# Patient Record
Sex: FEMALE | Race: WHITE | NOT HISPANIC OR LATINO | Employment: FULL TIME | URBAN - METROPOLITAN AREA
[De-identification: names, ages, dates, MRNs, and addresses within clinical notes are randomized per-mention and may not be internally consistent; named-entity substitution may affect disease eponyms.]

---

## 2017-01-16 ENCOUNTER — OFFICE VISIT (OUTPATIENT)
Dept: FAMILY MEDICINE | Facility: CLINIC | Age: 37
End: 2017-01-16
Payer: COMMERCIAL

## 2017-01-16 VITALS
WEIGHT: 240 LBS | BODY MASS INDEX: 40.97 KG/M2 | SYSTOLIC BLOOD PRESSURE: 121 MMHG | HEIGHT: 64 IN | OXYGEN SATURATION: 98 % | HEART RATE: 75 BPM | RESPIRATION RATE: 18 BRPM | TEMPERATURE: 98.1 F | DIASTOLIC BLOOD PRESSURE: 78 MMHG

## 2017-01-16 DIAGNOSIS — N97.9 FEMALE INFERTILITY: ICD-10-CM

## 2017-01-16 DIAGNOSIS — L65.9 HAIR LOSS: Primary | ICD-10-CM

## 2017-01-16 LAB
ERYTHROCYTE [DISTWIDTH] IN BLOOD BY AUTOMATED COUNT: 12.7 % (ref 10–15)
HCT VFR BLD AUTO: 41 % (ref 35–47)
HGB BLD-MCNC: 14.2 G/DL (ref 11.7–15.7)
MCH RBC QN AUTO: 29.2 PG (ref 26.5–33)
MCHC RBC AUTO-ENTMCNC: 34.6 G/DL (ref 31.5–36.5)
MCV RBC AUTO: 84 FL (ref 78–100)
PLATELET # BLD AUTO: 302 10E9/L (ref 150–450)
RBC # BLD AUTO: 4.87 10E12/L (ref 3.8–5.2)
WBC # BLD AUTO: 10.7 10E9/L (ref 4–11)

## 2017-01-16 PROCEDURE — 83540 ASSAY OF IRON: CPT | Performed by: NURSE PRACTITIONER

## 2017-01-16 PROCEDURE — 85027 COMPLETE CBC AUTOMATED: CPT | Performed by: NURSE PRACTITIONER

## 2017-01-16 PROCEDURE — 99213 OFFICE O/P EST LOW 20 MIN: CPT | Performed by: NURSE PRACTITIONER

## 2017-01-16 PROCEDURE — 83550 IRON BINDING TEST: CPT | Performed by: NURSE PRACTITIONER

## 2017-01-16 PROCEDURE — 84443 ASSAY THYROID STIM HORMONE: CPT | Performed by: NURSE PRACTITIONER

## 2017-01-16 PROCEDURE — 82728 ASSAY OF FERRITIN: CPT | Performed by: NURSE PRACTITIONER

## 2017-01-16 PROCEDURE — 36415 COLL VENOUS BLD VENIPUNCTURE: CPT | Performed by: NURSE PRACTITIONER

## 2017-01-16 NOTE — PROGRESS NOTES
"  SUBJECTIVE:                                                    Nancy Greene is a 36 year old female who presents to clinic today for the following health issues:      Hair Loss     Pt noticed hair loss for about 1 1/2 months. Handfuls of hair strands after taking shower and during the day. Pt had switched shampoo to a sulfate free shampoo and hair loss started after this. Pt stopped apple cider vinegar about 2 weeks ago.    Patient describes the following symptoms: Weight stable, no skin changes, no constipation, no loose stools and hair loss       Amount of exercise or physical activity: None    Problems taking medications regularly: No    Medication side effects: none    Diet: regular (no restrictions)    No changes in diet, no recent weight loss, change in stress.    No change in body hair.    No rashes.  She was taking a vitamin for skin and nails and stopped it about a month ago.  She has been on progesterone since July due to infertility.  She is seeing gyn and has been trying to get pregnant for the past 3 years.          Problem list and histories reviewed & adjusted, as indicated.  Additional history: as documented    Problem list, Medication list, Allergies, and Medical/Social/Surgical histories reviewed in [facility] and updated as appropriate.    ROS:  C: NEGATIVE for fever, chills, change in weight  INTEGUMENTARY/SKIN: NEGATIVE for worrisome rashes, moles or lesions  E/M: NEGATIVE for ear, mouth and throat problems  R: NEGATIVE for significant cough or SOB  CV: NEGATIVE for chest pain, palpitations or peripheral edema  GI: NEGATIVE for nausea, abdominal pain, heartburn, or change in bowel habits  ENDOCRINE: NEGATIVE for temperature intolerance, skin/hair changes  PSYCHIATRIC: NEGATIVE for changes in mood or affect    OBJECTIVE:                                                    /78 mmHg  Pulse 75  Temp(Src) 98.1  F (36.7  C) (Oral)  Resp 18  Ht 5' 4\" (1.626 m)  Wt 240 lb (108.863 kg) "  BMI 41.18 kg/m2  SpO2 98%  Body mass index is 41.18 kg/(m^2).  GENERAL: healthy, alert and no distress  SKIN: no suspicious lesions or rashes; no discrete areas of alopecia on the scalp  PSYCH: mentation appears normal, affect normal/bright         ASSESSMENT/PLAN:                                                            1. Hair loss  If all labs are normal, she could restart biotin and monitor for changes over the next couple of months.   Will refer to dermatology if hair loss persists.   - Progesterone Micronized (PROGESTERONE PO); Take by mouth daily 1 tablet daily  - TSH with free T4 reflex  - CBC with platelets  - Ferritin  - Iron and iron binding capacity    2. Female infertility  She will continue to follow up with gyn.           Deborah Mayes NP  [facility]

## 2017-01-16 NOTE — Clinical Note
Please abstract the following data from this visit with this patient into the appropriate field in Epic:  Pap smear done on this date: December 2015 (approximately), by this group: [facility] in [place], [place] results were normal.  Danika Carlson MA

## 2017-01-16 NOTE — NURSING NOTE
"Chief Complaint   Patient presents with     Hair Loss       Initial /78 mmHg  Pulse 75  Temp(Src) 98.1  F (36.7  C) (Oral)  Resp 18  Ht 5' 4\" (1.626 m)  Wt 240 lb (108.863 kg)  BMI 41.18 kg/m2  SpO2 98% Estimated body mass index is 41.18 kg/(m^2) as calculated from the following:    Height as of this encounter: 5' 4\" (1.626 m).    Weight as of this encounter: 240 lb (108.863 kg).  BP completed using cuff size: kylie Carlson MA      "

## 2017-01-17 LAB
FERRITIN SERPL-MCNC: 135 NG/ML (ref 12–150)
IRON SATN MFR SERPL: 25 % (ref 15–46)
IRON SERPL-MCNC: 84 UG/DL (ref 35–180)
TIBC SERPL-MCNC: 334 UG/DL (ref 240–430)
TSH SERPL DL<=0.005 MIU/L-ACNC: 1.69 MU/L (ref 0.4–4)

## 2017-04-26 ENCOUNTER — OFFICE VISIT (OUTPATIENT)
Dept: FAMILY MEDICINE | Facility: CLINIC | Age: 37
End: 2017-04-26
Payer: COMMERCIAL

## 2017-04-26 VITALS
TEMPERATURE: 98.7 F | WEIGHT: 225 LBS | HEART RATE: 67 BPM | OXYGEN SATURATION: 99 % | DIASTOLIC BLOOD PRESSURE: 82 MMHG | BODY MASS INDEX: 38.62 KG/M2 | RESPIRATION RATE: 18 BRPM | SYSTOLIC BLOOD PRESSURE: 120 MMHG

## 2017-04-26 DIAGNOSIS — J06.9 UPPER RESPIRATORY TRACT INFECTION, UNSPECIFIED TYPE: Primary | ICD-10-CM

## 2017-04-26 PROCEDURE — 99213 OFFICE O/P EST LOW 20 MIN: CPT | Performed by: NURSE PRACTITIONER

## 2017-04-26 RX ORDER — CODEINE PHOSPHATE AND GUAIFENESIN 10; 100 MG/5ML; MG/5ML
1-2 SOLUTION ORAL
Qty: 120 ML | Refills: 0 | Status: SHIPPED | OUTPATIENT
Start: 2017-04-26 | End: 2018-03-28

## 2017-04-26 NOTE — PROGRESS NOTES
SUBJECTIVE:                                                    Nancy Greene is a 36 year old female who presents to clinic today for the following health issues:      RESPIRATORY SYMPTOMS      Duration: Sunday  (3 days)    Description  cough started Sunday night and getting worst;keeping pt up at night, itchy ears, chest pain from coughing, Skin feels tingly, and joints feel achy.     Severity: moderate    Accompanying signs and symptoms: upper back pain possibly due to allergies or cough.     History (predisposing factors):  A few years ago she had a rash all over her body. Which concerns pt because she is having similar symptoms such as joint pain and tingly skin.     Precipitating or alleviating factors: None.     Therapies tried and outcome:  Claritin and Robitussin cough and congestion DM: Claritin was helpful yesterday but not for her cough.    Cough is non-productive.        Problem list and histories reviewed & adjusted, as indicated.  Additional history: as documented        Reviewed and updated as needed this visit by clinical staff       Reviewed and updated as needed this visit by Provider           OBJECTIVE:                                                    /82  Pulse 67  Temp 98.7  F (37.1  C) (Oral)  Resp 18  Wt 225 lb (102.1 kg)  SpO2 99%  BMI 38.62 kg/m2  Body mass index is 38.62 kg/(m^2).  GENERAL: healthy, alert and no distress  HENT: ear canals and TM's normal, nose and mouth without ulcers or lesions  NECK: no adenopathy, no asymmetry, masses, or scars and thyroid normal to palpation  RESP: lungs clear to auscultation - no rales, rhonchi or wheezes  CV: regular rate and rhythm, normal S1 S2, no S3 or S4, no murmur, click or rub, no peripheral edema and peripheral pulses strong  SKIN: no suspicious lesions or rashes         ASSESSMENT/PLAN:                                                            1. Upper respiratory tract infection, unspecified type  Discussed  symptomatic treatment measures.   Return to clinic if symptoms persist or worsen.  - guaiFENesin-codeine (ROBITUSSIN AC) 100-10 MG/5ML SOLN solution; Take 5-10 mLs by mouth nightly as needed for cough  Dispense: 120 mL; Refill: 0        Deborah Mayes NP  [facility]

## 2017-04-26 NOTE — MR AVS SNAPSHOT
After Visit Summary   4/26/2017    Nancy Greene    MRN: [id number]           Patient Information     Date Of Birth          1980        Visit Information        Provider Department      4/26/2017 2:00 PM Deborah Mayes NP [facility]        Today's Diagnoses     Upper respiratory tract infection, unspecified type    -  1       Follow-ups after your visit        Who to contact     If you have questions or need follow up information about today's clinic visit or your schedule please contact [facility] directly at [phone].  Normal or non-critical lab and imaging results will be communicated to you by Sensumhart, letter or phone within 4 business days after the clinic has received the results. If you do not hear from us within 7 days, please contact the clinic through Sensumhart or phone. If you have a critical or abnormal lab result, we will notify you by phone as soon as possible.  Submit refill requests through Fontself or call your pharmacy and they will forward the refill request to us. Please allow 3 business days for your refill to be completed.          Additional Information About Your Visit        MyChart Information     Fontself gives you secure access to your electronic health record. If you see a primary care provider, you can also send messages to your care team and make appointments. If you have questions, please call your primary care clinic.  If you do not have a primary care provider, please call [phone] and they will assist you.        Care EveryWhere ID     This is your Care EveryWhere ID. This could be used by other organizations to access your [facility] medical records  [id number]        Your Vitals Were     Pulse Temperature Respirations Pulse Oximetry BMI (Body Mass Index)       67 98.7  F (37.1  C) (Oral) 18 99% 38.62 kg/m2        Blood Pressure from Last 3 Encounters:   04/26/17 120/82   01/16/17 121/78   01/30/15  104/74    Weight from Last 3 Encounters:   04/26/17 225 lb (102.1 kg)   01/16/17 240 lb (108.9 kg)   01/30/15 260 lb 6.4 oz (118.1 kg)              Today, you had the following     No orders found for display         Today's Medication Changes          These changes are accurate as of: 4/26/17  2:35 PM.  If you have any questions, ask your nurse or doctor.               Start taking these medicines.        Dose/Directions    guaiFENesin-codeine 100-10 MG/5ML Soln solution   Commonly known as:  ROBITUSSIN AC   Used for:  Upper respiratory tract infection, unspecified type   Started by:  Deborah Mayes NP        Dose:  1-2 tsp.   Take 5-10 mLs by mouth nightly as needed for cough   Quantity:  120 mL   Refills:  0            Where to get your medicines      Some of these will need a paper prescription and others can be bought over the counter.  Ask your nurse if you have questions.     Bring a paper prescription for each of these medications     guaiFENesin-codeine 100-10 MG/5ML Soln solution                Primary Care Provider Office Phone # Fax #    Deborah Mayes [phone]       [facility] [address] [facility] [address]        Thank you!     Thank you for choosing [facility]  for your care. Our goal is always to provide you with excellent care. Hearing back from our patients is one way we can continue to improve our services. Please take a few minutes to complete the written survey that you may receive in the mail after your visit with us. Thank you!             Your Updated Medication List - Protect others around you: Learn how to safely use, store and throw away your medicines at www.disposemymeds.org.          This list is accurate as of: 4/26/17  2:35 PM.  Always use your most recent med list.                   Brand Name Dispense Instructions for use    biotin 1000 MCG Tabs tablet      Take 1,000 mcg by mouth daily.       calcium carbonate  500 MG tablet    OS-ROSSANA 500 mg [place]. Ca     one daily       fish oil-omega-3 fatty acids 1000 MG capsule      Take 2,000 mg by mouth daily.       folic acid 400 MCG tablet    FOLVITE     Take 400 mcg by mouth daily.       guaiFENesin-codeine 100-10 MG/5ML Soln solution    ROBITUSSIN AC    120 mL    Take 5-10 mLs by mouth nightly as needed for cough       MAGNESIUM PO          MULTIVITAMIN TABS   OR      1 TABLET DAILY       PROGESTERONE PO      Take by mouth daily 1 tablet daily

## 2018-03-28 ENCOUNTER — OFFICE VISIT (OUTPATIENT)
Dept: FAMILY MEDICINE | Facility: CLINIC | Age: 38
End: 2018-03-28
Payer: COMMERCIAL

## 2018-03-28 VITALS
HEART RATE: 74 BPM | TEMPERATURE: 97.2 F | SYSTOLIC BLOOD PRESSURE: 121 MMHG | DIASTOLIC BLOOD PRESSURE: 80 MMHG | BODY MASS INDEX: 39.86 KG/M2 | HEIGHT: 65 IN | RESPIRATION RATE: 16 BRPM | WEIGHT: 239.25 LBS | OXYGEN SATURATION: 97 %

## 2018-03-28 DIAGNOSIS — R43.9 DYSOSMIA: Primary | ICD-10-CM

## 2018-03-28 DIAGNOSIS — Z23 NEED FOR TDAP VACCINATION: ICD-10-CM

## 2018-03-28 DIAGNOSIS — G44.84 PRIMARY EXERTIONAL HEADACHE: ICD-10-CM

## 2018-03-28 DIAGNOSIS — J30.2 SEASONAL ALLERGIC RHINITIS, UNSPECIFIED CHRONICITY, UNSPECIFIED TRIGGER: ICD-10-CM

## 2018-03-28 PROCEDURE — 90471 IMMUNIZATION ADMIN: CPT | Performed by: NURSE PRACTITIONER

## 2018-03-28 PROCEDURE — 99214 OFFICE O/P EST MOD 30 MIN: CPT | Mod: 25 | Performed by: NURSE PRACTITIONER

## 2018-03-28 PROCEDURE — 90715 TDAP VACCINE 7 YRS/> IM: CPT | Performed by: NURSE PRACTITIONER

## 2018-03-28 RX ORDER — FLUTICASONE PROPIONATE 50 MCG
2 SPRAY, SUSPENSION (ML) NASAL DAILY
Qty: 1 BOTTLE | Refills: 11
Start: 2018-03-28

## 2018-03-28 NOTE — PROGRESS NOTES
"  SUBJECTIVE:   Nancy Greene is a 37 year old female who presents to clinic today for the following health issues:      Multiple Concerns:    1. Pt keep smelling cigarette smoke off and on randomly; pt is a non-smoker  x 1 month.   2. Pt state pt would get headache that would last for a couple hours when coughing, laughing and sneezing -gets a sharp, shooting pain all over head initially and then it becomes a dull ache in the occipital area x 3 months.  Two weeks ago had a headache seemed fairly typical for her migraine (which she gets infrequently).  3. Plugged both ears on and off x 2 months.    Nasal congestion started yesterday.   She takes Claritin for possible allergies.    Occasional lightheadedness.  No vision changes.  No speech difficulties.  No weakness.            Problem list and histories reviewed & adjusted, as indicated.  Additional history: as documented        Reviewed and updated as needed this visit by clinical staff  Tobacco  Allergies  Meds  Med Hx  Surg Hx  Fam Hx  Soc Hx      Reviewed and updated as needed this visit by Provider         ROS:  CONSTITUTIONAL: NEGATIVE for fever, chills, change in weight  EYES: NEGATIVE for vision changes or irritation  ENT/MOUTH: see HPI  RESP: NEGATIVE for significant cough or SOB  CV: NEGATIVE for chest pain, palpitations or peripheral edema  GI: NEGATIVE for nausea, abdominal pain, heartburn, or change in bowel habits  MUSCULOSKELETAL: NEGATIVE for significant arthralgias or myalgia  NEURO: see HPI  ENDOCRINE: NEGATIVE for temperature intolerance, skin/hair changes    OBJECTIVE:     /80  Pulse 74  Temp 97.2  F (36.2  C) (Oral)  Resp 16  Ht 5' 5\" (1.651 m)  Wt 239 lb 4 oz (108.5 kg)  LMP 03/15/2018 (Approximate)  SpO2 97%  Breastfeeding? No  BMI 39.81 kg/m2  Body mass index is 39.81 kg/(m^2).  GENERAL: healthy, alert and no distress  EYES: Eyes grossly normal to inspection, PERRL and conjunctivae and sclerae normal  HENT: ear " canals and TM's normal, nose and mouth without ulcers or lesions  NECK: no adenopathy, no asymmetry, masses, or scars and thyroid normal to palpation  RESP: lungs clear to auscultation - no rales, rhonchi or wheezes  CV: regular rate and rhythm, normal S1 S2, no S3 or S4, no murmur, click or rub, no peripheral edema and peripheral pulses strong  ABDOMEN: soft, nontender, no hepatosplenomegaly, no masses and bowel sounds normal  MS: no gross musculoskeletal defects noted, no edema  NEURO: Normal strength and tone, sensory exam grossly normal, mentation intact, cranial nerves 2-12 intact (although possible slightly decreased hearing left ear), gait normal including heel/toe/tandem walking, Romberg normal and rapid alternating movements normal        ASSESSMENT/PLAN:             1. Dysosmia  Due to this symptom and new exertional headache, will get an MRI of the brain.  If normal, will refer to ENT if symptoms persist after at least  2-week trial of Flonase for possible allergies vs sinusitis.   - MR Brain w Contrast; Future  - fluticasone (FLONASE) 50 MCG/ACT spray; Spray 2 sprays into both nostrils daily  Dispense: 1 Bottle; Refill: 11    2. Primary exertional headache  See above.   - MR Brain w Contrast; Future    3. Seasonal allergic rhinitis, unspecified chronicity, unspecified trigger    - fluticasone (FLONASE) 50 MCG/ACT spray; Spray 2 sprays into both nostrils daily  Dispense: 1 Bottle; Refill: 11    4. Need for Tdap vaccination    - TDAP VACCINE (ADACEL)  - ADMIN 1st VACCINE        Deborah Mayes NP  [facility]

## 2018-03-28 NOTE — PATIENT INSTRUCTIONS
Schedule the brain MRI.  Start Flonase daily.  If no improvement in a couple of weeks, let me know and I will do a referral to an ENT.

## 2018-03-28 NOTE — NURSING NOTE
Screening Questionnaire for Adult Immunization     Are you sick today?   No    Do you have allergies to medications, food or any vaccine?   No    Have you ever had a serious reaction after receiving a vaccination?   No    Do you have a long-term health problem with heart disease, lung disease,  asthma, kidney disease, diabetes, anemia, metabolic or blood disease?   No    Do you have cancer, leukemia, AIDS, or any immune system problem?   No    Do you take cortisone, prednisone, other steroids, or anticancer drugs, or  have you had any x-ray (radiation) treatments?   No    Have you had a seizure, brain, or other nervous system problem?   No    During the past year, have you received a transfusion of blood or blood       products, or been given a medicine called immune (gamma) globulin?   No    For women: Are you pregnant or is there a chance you could become         pregnant during the next month?   No    Have you received any vaccinations in the past 4 weeks?   No     Immunization questionnaire answers were all negative.      MNVFC doesn't apply on this patient     Form completed by patient.  Per orders of Deborah Mayes, injection(s) of TDAP given by Stormy Reed. Patient instructed to remain in clinic for 20 minutes afterwards, and to report any adverse reaction to me immediately    Stormy Reed MA

## 2018-04-04 ENCOUNTER — HOSPITAL ENCOUNTER (OUTPATIENT)
Dept: MRI IMAGING | Facility: CLINIC | Age: 38
Discharge: HOME | End: 2018-04-04
Attending: NURSE PRACTITIONER | Admitting: NURSE PRACTITIONER
Payer: COMMERCIAL

## 2018-04-04 DIAGNOSIS — R43.9 DYSOSMIA: ICD-10-CM

## 2018-04-04 DIAGNOSIS — G44.84 PRIMARY EXERTIONAL HEADACHE: ICD-10-CM

## 2018-04-04 PROCEDURE — 70553 MRI BRAIN STEM W/O & W/DYE: CPT

## 2018-04-04 PROCEDURE — A9585 GADOBUTROL INJECTION: HCPCS | Performed by: NURSE PRACTITIONER

## 2018-04-04 PROCEDURE — 25000128 H RX IP 250 OP 636: Performed by: NURSE PRACTITIONER

## 2018-04-04 RX ORDER — GADOBUTROL 604.72 MG/ML
10 INJECTION INTRAVENOUS ONCE
Status: COMPLETED | OUTPATIENT
Start: 2018-04-04 | End: 2018-04-04

## 2018-04-04 RX ADMIN — GADOBUTROL 10 ML: 604.72 INJECTION INTRAVENOUS at 16:46

## 2018-05-09 ENCOUNTER — OFFICE VISIT (OUTPATIENT)
Dept: FAMILY MEDICINE | Facility: CLINIC | Age: 38
End: 2018-05-09
Payer: COMMERCIAL

## 2018-05-09 ENCOUNTER — RADIANT APPOINTMENT (OUTPATIENT)
Dept: GENERAL RADIOLOGY | Facility: CLINIC | Age: 38
End: 2018-05-09
Attending: NURSE PRACTITIONER
Payer: COMMERCIAL

## 2018-05-09 VITALS
OXYGEN SATURATION: 97 % | RESPIRATION RATE: 18 BRPM | WEIGHT: 236.8 LBS | DIASTOLIC BLOOD PRESSURE: 81 MMHG | HEART RATE: 90 BPM | BODY MASS INDEX: 39.41 KG/M2 | TEMPERATURE: 98.6 F | SYSTOLIC BLOOD PRESSURE: 119 MMHG

## 2018-05-09 DIAGNOSIS — R06.89 DECREASED LUNG SOUNDS: ICD-10-CM

## 2018-05-09 DIAGNOSIS — M79.10 MYALGIA: ICD-10-CM

## 2018-05-09 DIAGNOSIS — M25.50 MULTIPLE JOINT PAIN: Primary | ICD-10-CM

## 2018-05-09 LAB
ALBUMIN UR-MCNC: NEGATIVE MG/DL
APPEARANCE UR: CLEAR
BACTERIA #/AREA URNS HPF: ABNORMAL /HPF
BASOPHILS # BLD AUTO: 0 10E9/L (ref 0–0.2)
BASOPHILS NFR BLD AUTO: 0.3 %
BILIRUB UR QL STRIP: NEGATIVE
COLOR UR AUTO: YELLOW
CRP SERPL-MCNC: 8.7 MG/L (ref 0–8)
DIFFERENTIAL METHOD BLD: ABNORMAL
EOSINOPHIL # BLD AUTO: 0 10E9/L (ref 0–0.7)
EOSINOPHIL NFR BLD AUTO: 0 %
ERYTHROCYTE [DISTWIDTH] IN BLOOD BY AUTOMATED COUNT: 13 % (ref 10–15)
ERYTHROCYTE [SEDIMENTATION RATE] IN BLOOD BY WESTERGREN METHOD: 13 MM/H (ref 0–20)
FLUAV+FLUBV AG SPEC QL: NEGATIVE
FLUAV+FLUBV AG SPEC QL: NEGATIVE
GLUCOSE UR STRIP-MCNC: NEGATIVE MG/DL
HCT VFR BLD AUTO: 43.7 % (ref 35–47)
HGB BLD-MCNC: 15 G/DL (ref 11.7–15.7)
HGB UR QL STRIP: ABNORMAL
KETONES UR STRIP-MCNC: NEGATIVE MG/DL
LEUKOCYTE ESTERASE UR QL STRIP: NEGATIVE
LYMPHOCYTES # BLD AUTO: 1 10E9/L (ref 0.8–5.3)
LYMPHOCYTES NFR BLD AUTO: 24.5 %
MCH RBC QN AUTO: 30.2 PG (ref 26.5–33)
MCHC RBC AUTO-ENTMCNC: 34.3 G/DL (ref 31.5–36.5)
MCV RBC AUTO: 88 FL (ref 78–100)
MONOCYTES # BLD AUTO: 0.6 10E9/L (ref 0–1.3)
MONOCYTES NFR BLD AUTO: 15.8 %
MUCOUS THREADS #/AREA URNS LPF: PRESENT /LPF
NEUTROPHILS # BLD AUTO: 2.3 10E9/L (ref 1.6–8.3)
NEUTROPHILS NFR BLD AUTO: 59.4 %
NITRATE UR QL: NEGATIVE
NON-SQ EPI CELLS #/AREA URNS LPF: ABNORMAL /LPF
PH UR STRIP: 5.5 PH (ref 5–7)
PLATELET # BLD AUTO: 232 10E9/L (ref 150–450)
RBC # BLD AUTO: 4.97 10E12/L (ref 3.8–5.2)
RBC #/AREA URNS AUTO: ABNORMAL /HPF
SOURCE: ABNORMAL
SP GR UR STRIP: 1.02 (ref 1–1.03)
SPECIMEN SOURCE: NORMAL
UROBILINOGEN UR STRIP-ACNC: 0.2 EU/DL (ref 0.2–1)
WBC # BLD AUTO: 3.9 10E9/L (ref 4–11)
WBC #/AREA URNS AUTO: ABNORMAL /HPF

## 2018-05-09 PROCEDURE — 86618 LYME DISEASE ANTIBODY: CPT | Performed by: NURSE PRACTITIONER

## 2018-05-09 PROCEDURE — 80053 COMPREHEN METABOLIC PANEL: CPT | Performed by: NURSE PRACTITIONER

## 2018-05-09 PROCEDURE — 85652 RBC SED RATE AUTOMATED: CPT | Performed by: NURSE PRACTITIONER

## 2018-05-09 PROCEDURE — 86038 ANTINUCLEAR ANTIBODIES: CPT | Performed by: NURSE PRACTITIONER

## 2018-05-09 PROCEDURE — 71046 X-RAY EXAM CHEST 2 VIEWS: CPT

## 2018-05-09 PROCEDURE — 81001 URINALYSIS AUTO W/SCOPE: CPT | Performed by: NURSE PRACTITIONER

## 2018-05-09 PROCEDURE — 36415 COLL VENOUS BLD VENIPUNCTURE: CPT | Performed by: NURSE PRACTITIONER

## 2018-05-09 PROCEDURE — 86140 C-REACTIVE PROTEIN: CPT | Performed by: NURSE PRACTITIONER

## 2018-05-09 PROCEDURE — 82550 ASSAY OF CK (CPK): CPT | Performed by: NURSE PRACTITIONER

## 2018-05-09 PROCEDURE — 85025 COMPLETE CBC W/AUTO DIFF WBC: CPT | Performed by: NURSE PRACTITIONER

## 2018-05-09 PROCEDURE — 87804 INFLUENZA ASSAY W/OPTIC: CPT | Performed by: NURSE PRACTITIONER

## 2018-05-09 PROCEDURE — 99214 OFFICE O/P EST MOD 30 MIN: CPT | Performed by: NURSE PRACTITIONER

## 2018-05-09 NOTE — PROGRESS NOTES
SUBJECTIVE:  Nancy Greene, a 37 year old female scheduled an appointment to discuss the following issues:     Multiple joint pain  Myalgia   For the past 2 days, she has had low back pain and bilateral hip pain as well as other joint pain that migrates, severe.  Feels like she can barely walk at times.   Low grade temp (100.3) the first night; diarrhea; frontal headache when she moves her eyes.  No vomiting or abdominal pain.  Decreased appetite.  No rashes.  No dysuria, hematuria, frequency.    No nose or mouth sores.    She had been taking Xyzal and Flonase and then started Pamprin for cramps.  She didn't know if it could be a drug interaction, so she stopped all of them.     Medical, social, surgical, and family histories reviewed.    ROS:  CONSTITUTIONAL:see HPI  INTEGUMENTARY/SKIN: NEGATIVE for worrisome rashes, moles or lesions  EYES: NEGATIVE for vision changes   ENT/MOUTH: NEGATIVE for ear, mouth and throat problems  RESP: NEGATIVE for significant cough or SOB  CV: NEGATIVE for chest pain, palpitations   GI: see HPI  : NEGATIVE for frequency, dysuria, or hematuria  MUSCULOSKELETAL:see HPI  NEURO: NEGATIVE for weakness, dizziness or paresthesias or headache  ENDOCRINE: NEGATIVE for temperature intolerance, skin/hair changes  HEME: NEGATIVE for bleeding problems  PSYCHIATRIC: NEGATIVE for changes in mood or affect    OBJECTIVE:  /81  Pulse 90  Temp 98.6  F (37  C) (Oral)  Resp 18  Wt 236 lb 12.8 oz (107.4 kg)  SpO2 97%  BMI 39.41 kg/m2  EXAM:  GENERAL APPEARANCE: healthy, alert and no distress  HENT: ear canals and TM's normal and nose and mouth without ulcers or lesions  RESP: decreased lung sounds RLL  CV: regular rates and rhythm, normal S1 S2, no S3 or S4 and no murmur, click or rub -  ABDOMEN:  soft, mild diffuse tenderness, no HSM or masses and bowel sounds normal  MS: paralumbar tenderness, no joint effusions  SKIN: no suspicious lesions or rashes  NEURO: Normal strength and  tone, sensory exam grossly normal, mentation intact and speech normal  PSYCH: mentation appears normal and affect normal/bright    ASSESSMENT/PLAN:  (M25.50) Multiple joint pain  (primary encounter diagnosis)  Comment:   Plan: Influenza A/B antigen, CBC with platelets         differential, ESR: Erythrocyte sedimentation         rate, Lyme Disease Tania with reflex to WB Serum,        CRP inflammation, Anti Nuclear Tania IgG by IFA         with Reflex, Comprehensive metabolic panel,         Urine Microscopic        Differential diagnosis includes viral syndrome, influenza, Lyme, rheumatological condition, pyelonephritis, pneumonia  UA and CBC are essentially negative.  Influenza is negative.  Other labs are pending.  Will encourage supportive cares and await for results.  She will follow up if symptoms are not improving by Monday.     (M79.1) Myalgia  Comment:   Plan: CBC with platelets differential, ESR:         Erythrocyte sedimentation rate, *UA reflex to         Microscopic, Lyme Disease Tania with reflex to WB        Serum, CRP inflammation, Anti Nuclear Tania IgG         by IFA with Reflex, Comprehensive metabolic         panel, CK total        See above.     (R06.89) Decreased lung sounds  Comment:   Plan: XR Chest 2 Views        Chest Xray interpreted as negative, will await radiologist's report   Likely due to obesity.

## 2018-05-10 LAB
ALBUMIN SERPL-MCNC: 3.9 G/DL (ref 3.4–5)
ALP SERPL-CCNC: 49 U/L (ref 40–150)
ALT SERPL W P-5'-P-CCNC: 37 U/L (ref 0–50)
ANA SER QL IF: NEGATIVE
ANION GAP SERPL CALCULATED.3IONS-SCNC: 9 MMOL/L (ref 3–14)
AST SERPL W P-5'-P-CCNC: 26 U/L (ref 0–45)
B BURGDOR IGG+IGM SER QL: 0.02 (ref 0–0.89)
BILIRUB SERPL-MCNC: 0.4 MG/DL (ref 0.2–1.3)
BUN SERPL-MCNC: 12 MG/DL (ref 7–30)
CALCIUM SERPL-MCNC: 8.8 MG/DL (ref 8.5–10.1)
CHLORIDE SERPL-SCNC: 107 MMOL/L (ref 94–109)
CK SERPL-CCNC: 80 U/L (ref 30–225)
CO2 SERPL-SCNC: 24 MMOL/L (ref 20–32)
CREAT SERPL-MCNC: 0.88 MG/DL (ref 0.52–1.04)
GFR SERPL CREATININE-BSD FRML MDRD: 73 ML/MIN/1.7M2
GLUCOSE SERPL-MCNC: 91 MG/DL (ref 70–99)
POTASSIUM SERPL-SCNC: 4.3 MMOL/L (ref 3.4–5.3)
PROT SERPL-MCNC: 7.9 G/DL (ref 6.8–8.8)
SODIUM SERPL-SCNC: 140 MMOL/L (ref 133–144)

## 2019-01-28 ENCOUNTER — RECORDS - HEALTHEAST (OUTPATIENT)
Dept: ADMINISTRATIVE | Facility: OTHER | Age: 39
End: 2019-01-28

## 2019-01-29 ENCOUNTER — TRANSFERRED RECORDS (OUTPATIENT)
Dept: HEALTH INFORMATION MANAGEMENT | Facility: CLINIC | Age: 39
End: 2019-01-29

## 2019-01-30 ENCOUNTER — ANESTHESIA - HEALTHEAST (OUTPATIENT)
Dept: OBGYN | Facility: CLINIC | Age: 39
End: 2019-01-30

## 2019-02-01 ENCOUNTER — HOME CARE/HOSPICE - HEALTHEAST (OUTPATIENT)
Dept: HOME HEALTH SERVICES | Facility: HOME HEALTH | Age: 39
End: 2019-02-01

## 2019-02-03 ENCOUNTER — HOME CARE/HOSPICE - HEALTHEAST (OUTPATIENT)
Dept: HOME HEALTH SERVICES | Facility: HOME HEALTH | Age: 39
End: 2019-02-03

## 2019-02-03 ENCOUNTER — MEDICAL CORRESPONDENCE (OUTPATIENT)
Dept: HEALTH INFORMATION MANAGEMENT | Facility: CLINIC | Age: 39
End: 2019-02-03

## 2019-02-15 ENCOUNTER — HEALTH MAINTENANCE LETTER (OUTPATIENT)
Age: 39
End: 2019-02-15

## 2019-05-16 NOTE — MR AVS SNAPSHOT
After Visit Summary   5/9/2018    Nancy Greene    MRN: [id number]           Patient Information     Date Of Birth          1980        Visit Information        Provider Department      5/9/2018 2:45 PM Deborah Mayes NP [facility]        Today's Diagnoses     Multiple joint pain    -  1    Myalgia        Decreased lung sounds           Follow-ups after your visit        Who to contact     If you have questions or need follow up information about today's clinic visit or your schedule please contact [facility] directly at [phone].  Normal or non-critical lab and imaging results will be communicated to you by [employer], letter or phone within 4 business days after the clinic has received the results. If you do not hear from us within 7 days, please contact the clinic through [employer] or phone. If you have a critical or abnormal lab result, we will notify you by phone as soon as possible.  Submit refill requests through [employer] or call your pharmacy and they will forward the refill request to us. Please allow 3 business days for your refill to be completed.          Additional Information About Your Visit        MyChart Information     [employer] gives you secure access to your electronic health record. If you see a primary care provider, you can also send messages to your care team and make appointments. If you have questions, please call your primary care clinic.  If you do not have a primary care provider, please call [phone] and they will assist you.        Care EveryWhere ID     This is your Care EveryWhere ID. This could be used by other organizations to access your [place] medical records  [id number]        Your Vitals Were     Pulse Temperature Respirations Pulse Oximetry BMI (Body Mass Index)       90 98.6  F (37  C) (Oral) 18 97% 39.41 kg/m2        Blood Pressure from Last 3 Encounters:   05/09/18 119/81   03/28/18 121/80  PT DAILY TREATMENT NOTE - [facility]  Patient Name: Ghazal Cast Date:2019 : 1941 [x]  Patient  Verified Payor: [place] / Plan: Tez Russo / Product Type: Medicare / In time:1100  Out time:1130 Total Treatment Time (min): 30 Total Timed Codes (min): 30  
1:1 Treatment Time ( only): 30 Visit #: 2 of 24 Treatment Area: Low back pain [M54.5] SUBJECTIVE Pain Level (0-10 scale): 0 Any medication changes, allergies to medications, adverse drug reactions, diagnosis change, or new procedure performed?: [x] No    [] Yes (see summary sheet for update) Subjective functional status/changes:   [] No changes reported Patient reports no new changes since initial evaluation. OBJECTIVE 30 min Therapeutic Exercise:  [x] See flow sheet :  
Rationale: increase ROM, increase strength and decrease pain to improve the patients ability to complete ADLs With 
 [x] TE 
 [] TA 
 [] neuro 
 [] other: Patient Education: [x] Review HEP [] Progressed/Changed HEP based on:  
[] positioning   [] body mechanics   [] transfers   [] heat/ice application   
[] other:   
 
Other Objective/Functional Measures:   
 
Pain Level (0-10 scale) post treatment: 0 
 
ASSESSMENT/Changes in Function: Patient responds well to treatment session. Patient required encouragement throughout treatment to perform all activities. He demonstrated decreased activity tolerance and was educated on  Energy conservation techniques. No adverse effects were noted from today's treatment session Patient will continue to benefit from skilled PT services to modify and progress therapeutic interventions, address functional mobility deficits, address ROM deficits, address strength deficits, analyze and address soft tissue restrictions, analyze and cue movement patterns, analyze and modify body mechanics/ergonomics, assess and modify postural abnormalities,   04/26/17 120/82    Weight from Last 3 Encounters:   05/09/18 236 lb 12.8 oz (107.4 kg)   03/28/18 239 lb 4 oz (108.5 kg)   04/26/17 225 lb (102.1 kg)              We Performed the Following     *UA reflex to Microscopic     Anti Nuclear Tania IgG by IFA with Reflex     CBC with platelets differential     CK total     Comprehensive metabolic panel     CRP inflammation     ESR: Erythrocyte sedimentation rate     Influenza A/B antigen     Lyme Disease Tania with reflex to WB Serum     Urine Microscopic        Primary Care Provider Office Phone # Fax #    Deborah ALHAJI Mayes, [phone] [address] [place] [address]        Equal Access to Services     ADRY GAR : Hadii elie Call, waaxda angela, qaybta kaalmada adepily, abdi solis . So [facility] [phone].    ATENCIÓN: Si habla español, tiene a asrmiento disposición servicios gratuitos de asistencia lingüística. Llame al [phone].    We comply with applicable federal civil rights laws and [place] laws. We do not discriminate on the basis of race, color, national origin, age, disability, sex, sexual orientation, or gender identity.            Thank you!     Thank you for choosing [facility]  for your care. Our goal is always to provide you with excellent care. Hearing back from our patients is one way we can continue to improve our services. Please take a few minutes to complete the written survey that you may receive in the mail after your visit with us. Thank you!             Your Updated Medication List - Protect others around you: Learn how to safely use, store and throw away your medicines at www.disposemymeds.org.          This list is accurate as of 5/9/18  5:58 PM.  Always use your most recent med list.                   Brand Name Dispense Instructions for use Diagnosis    biotin 1000 MCG Tabs tablet      Take 1,000 mcg by mouth daily.        calcium carbonate 500 MG  instruct in home and community integration to attain remaining goals. []  See Plan of Care 
[]  See progress note/recertification 
[]  See Discharge Summary Progress towards goals / Updated goals: 
Short Term Goals: To be accomplished in 2 weeks: 
               Patient will report compliance with HEP 1x/day to aid in rehabilitation program. 
               Status at IE: NA 
               Current:Initiated HEP 05/16/2019. 
  
  
Long Term Goals: To be accomplished in 4 weeks: 
               Patient will increase B LE strength to 4/5 MMT throughout to aid in completion of ADLs. Status at IE:3+/5 Current: Same as IE 
  
               Patient will report pain no greater than 0-2/10 throughout entire day to aid in completion of ADLs. Status at IE:0-8 Current:Same as IE 
  
               Patent will be able to stand for 15 mins to be able to complete ADLs. Status at IE:5 minutes Current:Same as IE 
  
               Patient will improve FOTO score to 65 points to demonstrate improvement in functional status. Status at IE:11 
               Current: Same as IE 
 
PLAN 
[]  Upgrade activities as tolerated     [x]  Continue plan of care 
[]  Update interventions per flow sheet      
[]  Discharge due to:_ 
[]  Other:_   
 
Christi Frye, PT, DPT 5/16/2019  11:04 AM 
 
Future Appointments Date Time Provider Caprice Leal 5/22/2019 11:30 AM Kerry Louis PT [facility]  
5/24/2019  3:00 PM Sabino Oppenheim, PT, DPT [facility]  
5/28/2019  2:00 PM Kerry Lousi PT [facility]  
6/5/2019  1:30 PM Kerry Louis PT [facility]  
6/7/2019 11:30 AM Sabino Oppenheim, PT, DPT [facility]  
6/10/2019 11:00 AM Kerry Louis PT [facility]  
6/12/2019 11:00 AM Kerry Louis PT [facility]  
6/17/2019 11:00 AM Kerry Louis PT [facility]  
 6/19/2019 11:00 AM AMADOR Muñiz [facility]  
6/24/2019 11:00 AM AMADOR Muñiz [facility]  
6/26/2019 11:00 AM AMADOR Muñiz [facility]  
 
 
 tablet    OS-ROSSANA 500 mg [place]. Ca     one daily        fish oil-omega-3 fatty acids 1000 MG capsule      Take 2,000 mg by mouth daily.        fluticasone 50 MCG/ACT spray    FLONASE    1 Bottle    Spray 2 sprays into both nostrils daily    Dysosmia, Seasonal allergic rhinitis, unspecified chronicity, unspecified trigger       folic acid 400 MCG tablet    FOLVITE     Take 400 mcg by mouth daily.        MAGNESIUM PO           MULTIVITAMIN TABS   OR      1 TABLET DAILY        PROGESTERONE PO      Take by mouth daily 1 tablet daily    Hair loss

## 2019-11-04 ENCOUNTER — HEALTH MAINTENANCE LETTER (OUTPATIENT)
Age: 39
End: 2019-11-04

## 2020-02-16 ENCOUNTER — HEALTH MAINTENANCE LETTER (OUTPATIENT)
Age: 40
End: 2020-02-16

## 2020-03-09 ENCOUNTER — MYC MEDICAL ADVICE (OUTPATIENT)
Dept: FAMILY MEDICINE | Facility: CLINIC | Age: 40
End: 2020-03-09

## 2020-03-09 ENCOUNTER — E-VISIT (OUTPATIENT)
Dept: FAMILY MEDICINE | Facility: CLINIC | Age: 40
End: 2020-03-09
Payer: COMMERCIAL

## 2020-03-09 DIAGNOSIS — Z20.828 EXPOSURE TO INFLUENZA: Primary | ICD-10-CM

## 2020-03-09 PROCEDURE — 99421 OL DIG E/M SVC 5-10 MIN: CPT | Performed by: NURSE PRACTITIONER

## 2020-03-09 RX ORDER — OSELTAMIVIR PHOSPHATE 75 MG/1
75 CAPSULE ORAL DAILY
Qty: 7 CAPSULE | Refills: 0 | Status: SHIPPED | OUTPATIENT
Start: 2020-03-09 | End: 2020-03-16

## 2020-09-15 ENCOUNTER — NURSE TRIAGE (OUTPATIENT)
Dept: NURSING | Facility: CLINIC | Age: 40
End: 2020-09-15

## 2020-09-15 NOTE — TELEPHONE ENCOUNTER
Pt informed today that a teacher at her 19 month old son's day care developed sx on 9/13 and tested positive for covid-19. Her son had close contact w/ this teacher on 9/11 but no sx at that time. Pt herself had no exposure to the individual w/ Covid. Her son has no sx nor does pt or her spouse. Asks if she should be tested. Disc'd care advice per Coronavirus Exposure - Adult guideline; advised no need for test at this time. Call back if sx develop including fever, SOB, cough, sore throat, nasal congestion or rash or if other household members develop sx     COVID 19 Nurse Triage Plan/Patient Instructions    Please be aware that novel coronavirus (COVID-19) may be circulating in the community. If you develop symptoms such as fever, cough, or SOB or if you have concerns about the presence of another infection including coronavirus (COVID-19), please contact your health care provider or visit [email or web address].     Disposition/Instructions    Home care recommended. Follow home care protocol based instructions.    Thank you for taking steps to prevent the spread of this virus.  o Limit your contact with others.  o Wear a simple mask to cover your cough.  o Wash your hands well and often.      Reason for Disposition    [1] No COVID-19 EXPOSURE BUT [2] living with someone who was exposed and who has no symptoms of COVID-19    Additional Information    Negative: COVID-19 has been diagnosed by a healthcare provider (HCP)    Negative: COVID-19 lab test positive    Negative: [1] Symptoms of COVID-19 (e.g., cough, fever, SOB, or others) AND [2] lives in an area with community spread    Negative: [1] Symptoms of COVID-19 (e.g., cough, fever, SOB, or others) AND [2] within 14 days of EXPOSURE (close contact) with diagnosed or suspected COVID-19 patient    Negative: [1] Symptoms of COVID-19 (e.g., cough, fever, SOB, or others) AND [2] within 14 days of travel from high-risk area for COVID-19 community spread (identified by CDC)     Negative: [1] Difficulty breathing (shortness of breath) occurs AND [2] onset > 14 days after COVID-19 EXPOSURE (Close Contact) AND [3] no community spread where patient lives    Negative: [1] Dry cough occurs AND [2] onset > 14 days after COVID-19 EXPOSURE AND [3] no community spread where patient lives    Negative: [1] Wet cough (i.e., white-yellow, yellow, green, or machelle colored sputum) AND [2] onset > 14 days after COVID-19 EXPOSURE AND [3] no community spread where patient lives    Negative: [1] Common cold symptoms AND [2] onset > 14 days after COVID-19 EXPOSURE AND [3] no community spread where patient lives    Negative: [1] COVID-19 EXPOSURE (Close Contact) within last 14 days AND [2] needs COVID-19 lab test to return to work AND [3] NO symptoms    Negative: [1] COVID-19 EXPOSURE (Close Contact) within last 14 days AND [2] exposed person is a healthcare worker who was NOT using all recommended personal protective equipment (i.e., a respirator-N95 mask, eye protection, gloves, and gown) AND [3] NO symptoms    Negative: [1] COVID-19 EXPOSURE (Close Contact) AND [2] within last 14 days BUT [3] NO symptoms    Negative: [1] COVID-19 EXPOSURE AND [2] 15 or more days ago AND [3] NO symptoms    Negative: [1] Living in area with community spread (identified by local PHD) BUT [2] NO symptoms    Negative: [1] Travel from area with community spread (identified by CDC) AND [2] within last 14 days BUT [3] NO symptoms    Protocols used: CORONAVIRUS (COVID-19) EXPOSURE-A- 8.4.20

## 2020-09-21 ENCOUNTER — COMMUNICATION - HEALTHEAST (OUTPATIENT)
Dept: SCHEDULING | Facility: CLINIC | Age: 40
End: 2020-09-21

## 2020-09-21 ENCOUNTER — OFFICE VISIT - HEALTHEAST (OUTPATIENT)
Dept: FAMILY MEDICINE | Facility: CLINIC | Age: 40
End: 2020-09-21

## 2020-09-21 DIAGNOSIS — Z20.822 COVID-19 RULED OUT: ICD-10-CM

## 2020-09-23 ENCOUNTER — AMBULATORY - HEALTHEAST (OUTPATIENT)
Dept: FAMILY MEDICINE | Facility: CLINIC | Age: 40
End: 2020-09-23

## 2020-09-23 DIAGNOSIS — Z20.822 COVID-19 RULED OUT: ICD-10-CM

## 2020-09-25 ENCOUNTER — COMMUNICATION - HEALTHEAST (OUTPATIENT)
Dept: SCHEDULING | Facility: CLINIC | Age: 40
End: 2020-09-25

## 2020-11-22 ENCOUNTER — HEALTH MAINTENANCE LETTER (OUTPATIENT)
Age: 40
End: 2020-11-22

## 2021-01-20 ENCOUNTER — OFFICE VISIT (OUTPATIENT)
Dept: FAMILY MEDICINE | Facility: CLINIC | Age: 41
End: 2021-01-20
Payer: COMMERCIAL

## 2021-01-20 VITALS
WEIGHT: 243 LBS | SYSTOLIC BLOOD PRESSURE: 118 MMHG | OXYGEN SATURATION: 97 % | TEMPERATURE: 98.5 F | HEART RATE: 76 BPM | RESPIRATION RATE: 16 BRPM | DIASTOLIC BLOOD PRESSURE: 80 MMHG | BODY MASS INDEX: 40.44 KG/M2

## 2021-01-20 DIAGNOSIS — J34.89 SINUS PAIN: ICD-10-CM

## 2021-01-20 DIAGNOSIS — M54.2 NECK PAIN: Primary | ICD-10-CM

## 2021-01-20 PROCEDURE — 99214 OFFICE O/P EST MOD 30 MIN: CPT | Performed by: NURSE PRACTITIONER

## 2021-01-20 RX ORDER — CYCLOBENZAPRINE HCL 10 MG
5-10 TABLET ORAL 3 TIMES DAILY PRN
Qty: 30 TABLET | Refills: 0 | Status: SHIPPED | OUTPATIENT
Start: 2021-01-20 | End: 2023-01-13

## 2021-01-20 NOTE — PROGRESS NOTES
Assessment & Plan     Neck pain  Discussed symptomatic treatment measures.  Try Flexeril.  Discussed the use and indication of this medication as well as potential side effects.   - cyclobenzaprine (FLEXERIL) 10 MG tablet; Take 0.5-1 tablets (5-10 mg) by mouth 3 times daily as needed for muscle spasms    Sinus pain  Restart Flonase.  See ENT for chronic recurring dysosmia and sinus symptoms (MRI was done previously)  - OTOLARYNGOLOGY REFERRAL          No follow-ups on file.    Deborah Mayes NP  [facility]    Mario Cruz is a 40 year old who presents to clinic today for the following health issues     HPI       Headache  Onset/Duration: x3 days   Description  Location: right side of back head radiates to the front   Character: throbbing pain, dull pain  Frequency:  All day   Duration:  On going for 3 days   Wake with headaches: YES  Able to do daily activities when headache present: YES  Intensity:  mild  Progression of Symptoms:  improving and worsening  Accompanying signs and symptoms:  Stiff neck: no  Neck or upper back pain: YES- right side sore  Sinus or URI symptoms no   Fever: no  Nausea or vomiting: no  Dizziness: YES- Monday night   Numbness/tingling: no  Weakness: no  Visual changes: blurry vision   History  Head trauma: YES- Nov 11 2020 and also about 20 years ago   Family history of migraines: YES  Daily pain medication use: no  Previous tests for headaches: YES- 3 years ago   Neurologist evaluation: no  Precipitating or Alleviating factors (light/sound/sleep/caffeine): nothing   Therapies tried and outcome: Tylenol              Outcome - usually effective  Frequent/daily pain medication use: no    Started with left ear pain 4 days ago, resolved by the next day, then developed pain right side of head.  Pain from occipital area radiating to right frontal and maxillary sinus area.  She has nasal congestion during the day that improves once she leaves the  house.     pulled up some moldy carpet in their bedroom about 3 weeks ago.      She has used Flonase intermittently in the past, not using it now.      Review of Systems         Objective    /80 (BP Location: Left arm, Patient Position: Sitting, Cuff Size: Adult Large)   Pulse 76   Temp 98.5  F (36.9  C) (Oral)   Resp 16   Wt 110.2 kg (243 lb)   LMP 01/12/2021 (Exact Date)   SpO2 97%   BMI 40.44 kg/m    Body mass index is 40.44 kg/m .  Physical Exam   GENERAL: healthy, alert and no distress  HENT: normal cephalic/atraumatic, ear canals and TM's normal, nose and mouth without ulcers or lesions, oropharynx clear, oral mucous membranes moist and sinuses: maxillary, frontal tenderness on right  NECK: no adenopathy, no asymmetry, masses, or scars and thyroid normal to palpation  RESP: lungs clear to auscultation - no rales, rhonchi or wheezes  CV: regular rate and rhythm, normal S1 S2, no S3 or S4, no murmur, click or rub, no peripheral edema and peripheral pulses strong  ABDOMEN: soft, nontender, no hepatosplenomegaly, no masses and bowel sounds normal  MS: tenderness right SCM and scalene, discomfort with flexion, extension, right rotation of the neck  SKIN: no suspicious lesions or rashes  NEURO: Normal strength and tone, mentation intact and speech normal

## 2021-05-30 ENCOUNTER — RECORDS - HEALTHEAST (OUTPATIENT)
Dept: ADMINISTRATIVE | Facility: CLINIC | Age: 41
End: 2021-05-30

## 2021-06-11 NOTE — TELEPHONE ENCOUNTER
Triage Call  Call from patient with concerns of runny nose, sinus congestion, cough and chest tightness associated with shortness of breath  Symptoms started 09/18/20 in the afternoon  Denies fever  Reports did have a potential expsure  Son is 19 month and had a direct exposure at  to a teacher who was tested positive and with two other kids have also tested positive.  Son has had same sinus congestions, runny nose and cough and his symptoms that started last Tue. He was tested for COVID-19 and was negative.  Reports she also has allergies and has been taking allergy medication and Musinex DM with relief at night. Taking Sudafed with some relief also  Quarantining as required for     Disposition  Call PCP when office is open per protocol. Will schedule virtual visit per workflow. Educated per care advice and verbalized understanding. Encouraged to call back with additional questions or concerns.    Reason for Disposition    [1] COVID-19 infection suspected by caller or triager AND [2] mild symptoms (cough, fever, or others) AND [3] no complications or SOB    Additional Information    Negative: SEVERE difficulty breathing (e.g., struggling for each breath, speaks in single words)    Negative: Difficult to awaken or acting confused (e.g., disoriented, slurred speech)    Negative: Bluish (or gray) lips or face now    Negative: Shock suspected (e.g., cold/pale/clammy skin, too weak to stand, low BP, rapid pulse)    Negative: Sounds like a life-threatening emergency to the triager    Negative: [1] COVID-19 exposure AND [2] no symptoms    Negative: COVID-19 and Breastfeeding, questions about    Negative: [1] Adult with possible COVID-19 symptoms AND [2] triager concerned about severity of symptoms or other causes    Negative: SEVERE or constant chest pain or pressure (Exception: mild central chest pain, present only when coughing)    Negative: MODERATE difficulty breathing (e.g., speaks in phrases, SOB even  at rest, pulse 100-120)    Negative: Patient sounds very sick or weak to the triager    Negative: MILD difficulty breathing (e.g., minimal/no SOB at rest, SOB with walking, pulse <100)    Negative: Chest pain or pressure    Negative: Fever > 103 F (39.4 C)    Negative: [1] Fever > 101 F (38.3 C) AND [2] age > 60    Negative: [1] Fever > 100.0 F (37.8 C) AND [2] bedridden (e.g., nursing home patient, CVA, chronic illness, recovering from surgery)    Negative: HIGH RISK patient (e.g., age > 64 years, diabetes, heart or lung disease, weak immune system) (Exception: Has already been evaluated by healthcare provider and has no new or worsening symptoms)    Negative: Fever present > 3 days (72 hours)    Negative: [1] Fever returns after gone for over 24 hours AND [2] symptoms worse or not improved    Negative: [1] Continuous (nonstop) coughing interferes with work or school AND [2] no improvement using cough treatment per protocol    Protocols used: CORONAVIRUS (COVID-19) DIAGNOSED OR [id number] 8.4.20    COVID 19 Nurse Triage Plan/Patient Instructions    Please be aware that novel coronavirus (COVID-19) may be circulating in the community. If you develop symptoms such as fever, cough, or SOB or if you have concerns about the presence of another infection including coronavirus (COVID-19), please contact your health care provider or visit [email or web address].     Disposition/Instructions    Virtual Visit with provider recommended. Reference Visit Selection Guide.    Thank you for taking steps to prevent the spread of this virus.  o Limit your contact with others.  o Wear a simple mask to cover your cough.  o Wash your hands well and often.    Resources    [facility]: About COVID-19: [email or web address]    CDC: What to Do If You're Sick: www.cdc.gov/coronavirus/2019-ncov/about/steps-when-sick.html    CDC: Ending Home Isolation: www.cdc.gov/coronavirus/2019-ncov/hcp/disposition-in-home-patients.html      CDC: Caring for Someone: www.cdc.gov/coronavirus/2019-ncov/if-you-are-sick/care-for-someone.html     [facility]: Interim Guidance for Hospital Discharge to Home: www.health.[facility].[place]/diseases/coronavirus/hcp/hospdischarge.pdf    [facility] clinical trials (COVID-19 research studies): [email or web address]-clinical-trials     Below are the COVID-19 hotlines at the [place] Department of Health ([facility]). Interpreters are available.   o For health questions: Call 003-908-4725 or 1-296.375.4841 (7 a.m. to 7 p.m.)  o For questions about schools and childcare: Call 643-941-0122 or 1-780.492.2080 (7 a.m. to 7 p.m.)     Opal Morrison RN [facility] 9/21/2020 3:27 PM

## 2021-06-11 NOTE — PROGRESS NOTES
"Nancy Smith is a 40 y.o. female who is being evaluated via a billable video visit.      The patient has been notified of following:     \"This video visit will be conducted via a call between you and your physician/provider. We have found that certain health care needs can be provided without the need for an in-person physical exam.  This service lets us provide the care you need with a video conversation.  If a prescription is necessary we can send it directly to your pharmacy.  If lab work is needed we can place an order for that and you can then stop by our lab to have the test done at a later time.    Video visits are billed at different rates depending on your insurance coverage. Please reach out to your insurance provider with any questions.    If during the course of the call the physician/provider feels a video visit is not appropriate, you will not be charged for this service.\"    Patient has given verbal consent to a Video visit? Yes  How would you like to obtain your AVS? AVS Preference: [employer].    Will anyone else be joining your video visit? No        Video Start Time: 4:46 PM    Additional provider notes:   Nancy Smith 40 y.o.. female with   Chief Complaint   Patient presents with     Cough     x few days, son was exposed to COVID-19 and was tested and was Negative.      running nose     wonder if she should be tested for COVID-19        S:    4 days ago felt irritation at roof of mouth and developed cough.    3 days ago with sinus pressure and pain.  xyzal and mucinex made symptoms better.  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  2 more kids in his class also tested positive for COVID19. Son was tested and result was negative.  Son had runny nose and cough similar to the patient's symptoms.      PMH: none  Meds: xyzal and mucinex as mentioned above  Personal/social: not a smoker.  .    O:  Constitutional: Patient is oriented to person, place, and time. Patient appears well-developed and well-nourished. No distress.   Head: Normocephalic and atraumatic.   Right Ear: External ear normal.   Left Ear: External ear normal.   Nose: Nose normal.   Eyes: Conjunctivae and EOM are normal. Right eye exhibits no discharge. Left eye exhibits no discharge. No scleral icterus.   Neurological: Patient is alert and oriented to person, place, and time.   The patient has good eye contact.  No psychomotor retardation or stereotypical behaviors.  Speech was regular rate, regular rhythm, adequate responses.  Mood was stable and affect was congruent mood.  No suicidal or homicidal intent.  No hallucination.      A/P:  Diagnoses and all orders for this visit:    COVID-19 ruled out  -     Symptomatic COVID-19 Virus (CORONAVIRUS) PCR; Future; Expected date: 09/21/2020  For now, practice self-observation and remain alert for feelings of feverish, cough, shortness of breath.  Take your temperature daily.  Self isolate in the home.  If you have to go out, facemask and avoid public gatherings, public transportation, and maintain distance (6 feet) from others.      Video-Visit Details    Type of service:  Video Visit    Video End Time (time video stopped): 5:04 PM  Originating Location (pt. Location): Home    Distant Location (provider location):  [facility]     Platform used for Video Visit: [employer]      Yu Crenshaw MD

## 2021-06-23 NOTE — ANESTHESIA PREPROCEDURE EVALUATION
Anesthesia Evaluation      Patient summary reviewed   No history of anesthetic complications     Airway   Mallampati: I   Pulmonary - negative ROS                          Cardiovascular - negative ROS   Neuro/Psych - negative ROS     Endo/Other    (+) obesity, pregnant     GI/Hepatic/Renal - negative ROS           Dental - normal exam                        Anesthesia Plan  Planned anesthetic: epidural    ASA 3     Anesthetic plan and risks discussed with: patient and spouse    Post-op plan: routine recovery

## 2021-06-23 NOTE — ANESTHESIA PROCEDURE NOTES
Epidural Block    Patient location during procedure: OB  Time Called: 1/30/2019 3:08 AM  Reason for Block:labor epidural  Staffing:  Performing  Anesthesiologist: Levi Hernández MD  Preanesthetic Checklist  Completed: patient identified, risks, benefits, and alternatives discussed, timeout performed, consent obtained, at patient's request, airway assessed, oxygen available, suction available, emergency drugs available and hand hygiene performed  Procedure  Patient position: sitting  Prep: ChloraPrep  Patient monitoring: continuous pulse oximetry, heart rate and blood pressure  Approach: midline  Location: L3-L4  Injection technique: TOMMY saline  Number of Attempts:1  Needle  Needle type: Yesenia   Needle gauge: 18 G     Catheter in Space: 4  Assessment  Sensory level:  No complications

## 2021-06-23 NOTE — ANESTHESIA POSTPROCEDURE EVALUATION
[facility]  * No procedures listed *  Anesthesia type: epidural    Patient location: Labor and Delivery  Last vitals:   Vitals:    01/31/19 0345   BP: 96/58   Pulse: 78   Resp: 18   Temp: 36.7  C (98  F)   SpO2: 97%     Post vital signs: stable  Level of consciousness: awake and responds to simple questions  Post-anesthesia pain: pain controlled  Post-anesthesia nausea and vomiting: no  Pulmonary: unassisted, return to baseline  Cardiovascular: stable and blood pressure at baseline  Hydration: adequate  Anesthetic events: no    QCDR Measures:  ASA# 11 - Liat-op Cardiac Arrest: ASA11B - Patient did NOT experience unanticipated cardiac arrest  ASA# 12 - Liat-op Mortality Rate: ASA12B - Patient did NOT die  ASA# 13 - PACU Re-Intubation Rate: NA - No ETT / LMA used for case  ASA# 10 - Composite Anes Safety: ASA10A - No serious adverse event    Additional Notes:

## 2021-09-15 ENCOUNTER — APPOINTMENT (OUTPATIENT)
Dept: RADIOLOGY | Facility: CLINIC | Age: 41
DRG: 281 | End: 2021-09-15
Payer: COMMERCIAL

## 2021-09-15 ENCOUNTER — HOSPITAL ENCOUNTER (INPATIENT)
Facility: CLINIC | Age: 41
LOS: 1 days | Discharge: OTHER INSTITUTION | DRG: 281 | End: 2021-09-16
Attending: EMERGENCY MEDICINE | Admitting: HOSPITALIST
Payer: COMMERCIAL

## 2021-09-15 DIAGNOSIS — I21.9 ACUTE MYOCARDIAL INFARCTION, INITIAL EPISODE OF CARE (H): Primary | ICD-10-CM

## 2021-09-15 DIAGNOSIS — I21.4 NSTEMI (NON-ST ELEVATED MYOCARDIAL INFARCTION) (H): ICD-10-CM

## 2021-09-15 DIAGNOSIS — R07.9 CHEST PAIN: ICD-10-CM

## 2021-09-15 LAB
ALBUMIN SERPL-MCNC: 3.6 G/DL (ref 3.5–5)
ALP SERPL-CCNC: 49 U/L (ref 45–120)
ALT SERPL W P-5'-P-CCNC: 10 U/L (ref 0–45)
ANION GAP SERPL CALCULATED.3IONS-SCNC: 9 MMOL/L (ref 5–18)
AST SERPL W P-5'-P-CCNC: 11 U/L (ref 0–40)
BASOPHILS # BLD AUTO: 0.1 10E3/UL (ref 0–0.2)
BASOPHILS NFR BLD AUTO: 0 %
BILIRUB SERPL-MCNC: 0.5 MG/DL (ref 0–1)
BUN SERPL-MCNC: 10 MG/DL (ref 8–22)
CALCIUM SERPL-MCNC: 8.9 MG/DL (ref 8.5–10.5)
CHLORIDE BLD-SCNC: 107 MMOL/L (ref 98–107)
CO2 SERPL-SCNC: 24 MMOL/L (ref 22–31)
CREAT SERPL-MCNC: 0.86 MG/DL (ref 0.6–1.1)
D DIMER PPP FEU-MCNC: <=0.27 UG/ML FEU (ref 0–0.5)
EOSINOPHIL # BLD AUTO: 0.1 10E3/UL (ref 0–0.7)
EOSINOPHIL NFR BLD AUTO: 1 %
ERYTHROCYTE [DISTWIDTH] IN BLOOD BY AUTOMATED COUNT: 12.8 % (ref 10–15)
GFR SERPL CREATININE-BSD FRML MDRD: 85 ML/MIN/1.73M2
GLUCOSE BLD-MCNC: 128 MG/DL (ref 70–125)
HCT VFR BLD AUTO: 39.5 % (ref 35–47)
HGB BLD-MCNC: 13.6 G/DL (ref 11.7–15.7)
IMM GRANULOCYTES # BLD: 0.1 10E3/UL
IMM GRANULOCYTES NFR BLD: 0 %
LIPASE SERPL-CCNC: 64 U/L (ref 0–52)
LYMPHOCYTES # BLD AUTO: 2.3 10E3/UL (ref 0.8–5.3)
LYMPHOCYTES NFR BLD AUTO: 19 %
MCH RBC QN AUTO: 29.4 PG (ref 26.5–33)
MCHC RBC AUTO-ENTMCNC: 34.4 G/DL (ref 31.5–36.5)
MCV RBC AUTO: 85 FL (ref 78–100)
MONOCYTES # BLD AUTO: 0.6 10E3/UL (ref 0–1.3)
MONOCYTES NFR BLD AUTO: 5 %
NEUTROPHILS # BLD AUTO: 8.8 10E3/UL (ref 1.6–8.3)
NEUTROPHILS NFR BLD AUTO: 75 %
NRBC # BLD AUTO: 0 10E3/UL
NRBC BLD AUTO-RTO: 0 /100
PLATELET # BLD AUTO: 272 10E3/UL (ref 150–450)
POTASSIUM BLD-SCNC: 4 MMOL/L (ref 3.5–5)
PROT SERPL-MCNC: 6.7 G/DL (ref 6–8)
RBC # BLD AUTO: 4.63 10E6/UL (ref 3.8–5.2)
SODIUM SERPL-SCNC: 140 MMOL/L (ref 136–145)
TROPONIN I SERPL-MCNC: 0.03 NG/ML (ref 0–0.29)
WBC # BLD AUTO: 12 10E3/UL (ref 4–11)

## 2021-09-15 PROCEDURE — 96376 TX/PRO/DX INJ SAME DRUG ADON: CPT

## 2021-09-15 PROCEDURE — 36415 COLL VENOUS BLD VENIPUNCTURE: CPT | Performed by: PHYSICIAN ASSISTANT

## 2021-09-15 PROCEDURE — 84484 ASSAY OF TROPONIN QUANT: CPT | Performed by: EMERGENCY MEDICINE

## 2021-09-15 PROCEDURE — 99285 EMERGENCY DEPT VISIT HI MDM: CPT | Mod: 25

## 2021-09-15 PROCEDURE — 85025 COMPLETE CBC W/AUTO DIFF WBC: CPT | Performed by: EMERGENCY MEDICINE

## 2021-09-15 PROCEDURE — 71046 X-RAY EXAM CHEST 2 VIEWS: CPT

## 2021-09-15 PROCEDURE — 85379 FIBRIN DEGRADATION QUANT: CPT | Performed by: PHYSICIAN ASSISTANT

## 2021-09-15 PROCEDURE — 93005 ELECTROCARDIOGRAM TRACING: CPT | Performed by: EMERGENCY MEDICINE

## 2021-09-15 PROCEDURE — 96365 THER/PROPH/DIAG IV INF INIT: CPT

## 2021-09-15 PROCEDURE — 96366 THER/PROPH/DIAG IV INF ADDON: CPT

## 2021-09-15 PROCEDURE — 80053 COMPREHEN METABOLIC PANEL: CPT | Performed by: PHYSICIAN ASSISTANT

## 2021-09-15 PROCEDURE — 83690 ASSAY OF LIPASE: CPT | Performed by: PHYSICIAN ASSISTANT

## 2021-09-15 PROCEDURE — C9803 HOPD COVID-19 SPEC COLLECT: HCPCS

## 2021-09-15 RX ORDER — KETOROLAC TROMETHAMINE 30 MG/ML
30 INJECTION, SOLUTION INTRAMUSCULAR; INTRAVENOUS ONCE
Status: DISCONTINUED | OUTPATIENT
Start: 2021-09-15 | End: 2021-09-16

## 2021-09-15 ASSESSMENT — ENCOUNTER SYMPTOMS
SHORTNESS OF BREATH: 1
ABDOMINAL PAIN: 0

## 2021-09-16 ENCOUNTER — APPOINTMENT (OUTPATIENT)
Dept: CARDIOLOGY | Facility: CLINIC | Age: 41
DRG: 281 | End: 2021-09-16
Attending: HOSPITALIST
Payer: COMMERCIAL

## 2021-09-16 ENCOUNTER — HOSPITAL ENCOUNTER (INPATIENT)
Facility: HOSPITAL | Age: 41
LOS: 1 days | Discharge: HOME | DRG: 287 | End: 2021-09-17
Attending: INTERNAL MEDICINE | Admitting: INTERNAL MEDICINE
Payer: COMMERCIAL

## 2021-09-16 VITALS
DIASTOLIC BLOOD PRESSURE: 64 MMHG | HEIGHT: 67 IN | TEMPERATURE: 97.9 F | SYSTOLIC BLOOD PRESSURE: 128 MMHG | BODY MASS INDEX: 39.27 KG/M2 | OXYGEN SATURATION: 98 % | WEIGHT: 250.2 LBS | RESPIRATION RATE: 20 BRPM | HEART RATE: 93 BPM

## 2021-09-16 DIAGNOSIS — R07.9 CHEST PAIN, UNSPECIFIED TYPE: Primary | ICD-10-CM

## 2021-09-16 DIAGNOSIS — I21.4 NSTEMI (NON-ST ELEVATED MYOCARDIAL INFARCTION) (H): ICD-10-CM

## 2021-09-16 PROBLEM — Z98.890 STATUS POST CORONARY ANGIOGRAM: Status: ACTIVE | Noted: 2021-09-16

## 2021-09-16 LAB
ACT BLD: 126 SECONDS (ref 74–150)
ATRIAL RATE - MUSE: 73 BPM
ATRIAL RATE - MUSE: 81 BPM
CATH EF ESTIMATED: 60 %
CHOLEST SERPL-MCNC: 158 MG/DL
DIASTOLIC BLOOD PRESSURE - MUSE: 77 MMHG
DIASTOLIC BLOOD PRESSURE - MUSE: NORMAL MMHG
ERYTHROCYTE [DISTWIDTH] IN BLOOD BY AUTOMATED COUNT: 12.8 % (ref 10–15)
HCT VFR BLD AUTO: 38.4 % (ref 35–47)
HDLC SERPL-MCNC: 40 MG/DL
HGB BLD-MCNC: 13.1 G/DL (ref 11.7–15.7)
HOLD SPECIMEN: NORMAL
HOLD SPECIMEN: NORMAL
INTERPRETATION ECG - MUSE: NORMAL
INTERPRETATION ECG - MUSE: NORMAL
LDLC SERPL CALC-MCNC: 90 MG/DL
MCH RBC QN AUTO: 29 PG (ref 26.5–33)
MCHC RBC AUTO-ENTMCNC: 34.1 G/DL (ref 31.5–36.5)
MCV RBC AUTO: 85 FL (ref 78–100)
P AXIS - MUSE: 35 DEGREES
P AXIS - MUSE: 38 DEGREES
PLATELET # BLD AUTO: 273 10E3/UL (ref 150–450)
PR INTERVAL - MUSE: 128 MS
PR INTERVAL - MUSE: 146 MS
QRS DURATION - MUSE: 76 MS
QRS DURATION - MUSE: 80 MS
QT - MUSE: 384 MS
QT - MUSE: 414 MS
QTC - MUSE: 446 MS
QTC - MUSE: 456 MS
R AXIS - MUSE: 17 DEGREES
R AXIS - MUSE: 5 DEGREES
RBC # BLD AUTO: 4.52 10E6/UL (ref 3.8–5.2)
SARS-COV-2 RNA RESP QL NAA+PROBE: NEGATIVE
SYSTOLIC BLOOD PRESSURE - MUSE: 127 MMHG
SYSTOLIC BLOOD PRESSURE - MUSE: NORMAL MMHG
T AXIS - MUSE: 0 DEGREES
T AXIS - MUSE: 9 DEGREES
TRIGL SERPL-MCNC: 138 MG/DL
TROPONIN I SERPL-MCNC: 0.77 NG/ML (ref 0–0.29)
TROPONIN I SERPL-MCNC: 2.83 NG/ML (ref 0–0.29)
UFH PPP CHRO-ACNC: 0.27 IU/ML
VENTRICULAR RATE- MUSE: 73 BPM
VENTRICULAR RATE- MUSE: 81 BPM
WBC # BLD AUTO: 12.3 10E3/UL (ref 4–11)

## 2021-09-16 PROCEDURE — 250N000013 HC RX MED GY IP 250 OP 250 PS 637: Performed by: HOSPITALIST

## 2021-09-16 PROCEDURE — 83718 ASSAY OF LIPOPROTEIN: CPT | Performed by: INTERNAL MEDICINE

## 2021-09-16 PROCEDURE — 258N000003 HC RX IP 258 OP 636: Performed by: INTERNAL MEDICINE

## 2021-09-16 PROCEDURE — 255N000002 HC RX 255 OP 636: Performed by: INTERNAL MEDICINE

## 2021-09-16 PROCEDURE — 999N000157 HC STATISTIC RCP TIME EA 10 MIN

## 2021-09-16 PROCEDURE — 87635 SARS-COV-2 COVID-19 AMP PRB: CPT | Performed by: EMERGENCY MEDICINE

## 2021-09-16 PROCEDURE — 36415 COLL VENOUS BLD VENIPUNCTURE: CPT | Performed by: INTERNAL MEDICINE

## 2021-09-16 PROCEDURE — C8929 TTE W OR WO FOL WCON,DOPPLER: HCPCS

## 2021-09-16 PROCEDURE — 99207 PR CDG-CODE CATEGORY CHANGED: CPT | Performed by: HOSPITALIST

## 2021-09-16 PROCEDURE — 85018 HEMOGLOBIN: CPT | Performed by: HOSPITALIST

## 2021-09-16 PROCEDURE — 250N000009 HC RX 250: Performed by: INTERNAL MEDICINE

## 2021-09-16 PROCEDURE — 258N000003 HC RX IP 258 OP 636: Performed by: HOSPITALIST

## 2021-09-16 PROCEDURE — 99152 MOD SED SAME PHYS/QHP 5/>YRS: CPT | Performed by: INTERNAL MEDICINE

## 2021-09-16 PROCEDURE — 93306 TTE W/DOPPLER COMPLETE: CPT | Mod: 26 | Performed by: INTERNAL MEDICINE

## 2021-09-16 PROCEDURE — 36415 COLL VENOUS BLD VENIPUNCTURE: CPT | Performed by: PHYSICIAN ASSISTANT

## 2021-09-16 PROCEDURE — C1894 INTRO/SHEATH, NON-LASER: HCPCS | Performed by: INTERNAL MEDICINE

## 2021-09-16 PROCEDURE — 85347 COAGULATION TIME ACTIVATED: CPT

## 2021-09-16 PROCEDURE — 36415 COLL VENOUS BLD VENIPUNCTURE: CPT | Performed by: HOSPITALIST

## 2021-09-16 PROCEDURE — 210N000002 HC R&B HEART CARE

## 2021-09-16 PROCEDURE — 250N000013 HC RX MED GY IP 250 OP 250 PS 637: Performed by: EMERGENCY MEDICINE

## 2021-09-16 PROCEDURE — 250N000011 HC RX IP 250 OP 636: Performed by: EMERGENCY MEDICINE

## 2021-09-16 PROCEDURE — 93005 ELECTROCARDIOGRAM TRACING: CPT | Performed by: EMERGENCY MEDICINE

## 2021-09-16 PROCEDURE — 250N000011 HC RX IP 250 OP 636: Performed by: INTERNAL MEDICINE

## 2021-09-16 PROCEDURE — 272N000001 HC OR GENERAL SUPPLY STERILE: Performed by: INTERNAL MEDICINE

## 2021-09-16 PROCEDURE — 99207 PR NO CHARGE FOLLOW UP PS: CPT | Performed by: INTERNAL MEDICINE

## 2021-09-16 PROCEDURE — 4A023N7 MEASUREMENT OF CARDIAC SAMPLING AND PRESSURE, LEFT HEART, PERCUTANEOUS APPROACH: ICD-10-PCS | Performed by: INTERNAL MEDICINE

## 2021-09-16 PROCEDURE — 84484 ASSAY OF TROPONIN QUANT: CPT | Performed by: HOSPITALIST

## 2021-09-16 PROCEDURE — C1769 GUIDE WIRE: HCPCS | Performed by: INTERNAL MEDICINE

## 2021-09-16 PROCEDURE — 250N000013 HC RX MED GY IP 250 OP 250 PS 637: Performed by: NURSE PRACTITIONER

## 2021-09-16 PROCEDURE — B2111ZZ FLUOROSCOPY OF MULTIPLE CORONARY ARTERIES USING LOW OSMOLAR CONTRAST: ICD-10-PCS | Performed by: INTERNAL MEDICINE

## 2021-09-16 PROCEDURE — 255N000002 HC RX 255 OP 636: Performed by: HOSPITALIST

## 2021-09-16 PROCEDURE — 250N000013 HC RX MED GY IP 250 OP 250 PS 637: Performed by: INTERNAL MEDICINE

## 2021-09-16 PROCEDURE — 999N000208 ECHOCARDIOGRAM COMPLETE

## 2021-09-16 PROCEDURE — B2151ZZ FLUOROSCOPY OF LEFT HEART USING LOW OSMOLAR CONTRAST: ICD-10-PCS | Performed by: INTERNAL MEDICINE

## 2021-09-16 PROCEDURE — 93458 L HRT ARTERY/VENTRICLE ANGIO: CPT | Mod: 26 | Performed by: INTERNAL MEDICINE

## 2021-09-16 PROCEDURE — 93458 L HRT ARTERY/VENTRICLE ANGIO: CPT | Performed by: INTERNAL MEDICINE

## 2021-09-16 PROCEDURE — 93005 ELECTROCARDIOGRAM TRACING: CPT | Performed by: HOSPITALIST

## 2021-09-16 PROCEDURE — 85520 HEPARIN ASSAY: CPT | Performed by: INTERNAL MEDICINE

## 2021-09-16 PROCEDURE — 93010 ELECTROCARDIOGRAM REPORT: CPT | Performed by: INTERNAL MEDICINE

## 2021-09-16 PROCEDURE — 99223 1ST HOSP IP/OBS HIGH 75: CPT | Performed by: HOSPITALIST

## 2021-09-16 PROCEDURE — 93005 ELECTROCARDIOGRAM TRACING: CPT

## 2021-09-16 PROCEDURE — 99223 1ST HOSP IP/OBS HIGH 75: CPT | Mod: 25 | Performed by: INTERNAL MEDICINE

## 2021-09-16 PROCEDURE — 210N000001 HC R&B IMCU HEART CARE

## 2021-09-16 PROCEDURE — 84484 ASSAY OF TROPONIN QUANT: CPT | Performed by: PHYSICIAN ASSISTANT

## 2021-09-16 PROCEDURE — 99233 SBSQ HOSP IP/OBS HIGH 50: CPT | Performed by: HOSPITALIST

## 2021-09-16 RX ORDER — ASPIRIN 81 MG/1
324 TABLET, CHEWABLE ORAL ONCE
Status: COMPLETED | OUTPATIENT
Start: 2021-09-16 | End: 2021-09-16

## 2021-09-16 RX ORDER — NITROGLYCERIN 0.4 MG/1
0.4 TABLET SUBLINGUAL
Status: DISCONTINUED | OUTPATIENT
Start: 2021-09-16 | End: 2021-09-16 | Stop reason: HOSPADM

## 2021-09-16 RX ORDER — HEPARIN SODIUM 10000 [USP'U]/100ML
0-5000 INJECTION, SOLUTION INTRAVENOUS CONTINUOUS
Status: DISCONTINUED | OUTPATIENT
Start: 2021-09-16 | End: 2021-09-16

## 2021-09-16 RX ORDER — ONDANSETRON 4 MG/1
4 TABLET, ORALLY DISINTEGRATING ORAL EVERY 6 HOURS PRN
Status: DISCONTINUED | OUTPATIENT
Start: 2021-09-16 | End: 2021-09-16 | Stop reason: HOSPADM

## 2021-09-16 RX ORDER — NALOXONE HYDROCHLORIDE 0.4 MG/ML
0.2 INJECTION, SOLUTION INTRAMUSCULAR; INTRAVENOUS; SUBCUTANEOUS
Status: ACTIVE | OUTPATIENT
Start: 2021-09-16 | End: 2021-09-16

## 2021-09-16 RX ORDER — OXYCODONE HYDROCHLORIDE 5 MG/1
10 TABLET ORAL EVERY 4 HOURS PRN
Status: DISCONTINUED | OUTPATIENT
Start: 2021-09-16 | End: 2021-09-17 | Stop reason: HOSPADM

## 2021-09-16 RX ORDER — SODIUM CHLORIDE, SODIUM LACTATE, POTASSIUM CHLORIDE, CALCIUM CHLORIDE 600; 310; 30; 20 MG/100ML; MG/100ML; MG/100ML; MG/100ML
INJECTION, SOLUTION INTRAVENOUS CONTINUOUS
Status: DISCONTINUED | OUTPATIENT
Start: 2021-09-16 | End: 2021-09-17 | Stop reason: HOSPADM

## 2021-09-16 RX ORDER — FENTANYL CITRATE 50 UG/ML
25 INJECTION, SOLUTION INTRAMUSCULAR; INTRAVENOUS
Status: DISCONTINUED | OUTPATIENT
Start: 2021-09-16 | End: 2021-09-17 | Stop reason: HOSPADM

## 2021-09-16 RX ORDER — ACETAMINOPHEN 325 MG/1
650 TABLET ORAL EVERY 6 HOURS PRN
Status: DISCONTINUED | OUTPATIENT
Start: 2021-09-16 | End: 2021-09-17 | Stop reason: HOSPADM

## 2021-09-16 RX ORDER — SODIUM CHLORIDE 9 MG/ML
75 INJECTION, SOLUTION INTRAVENOUS CONTINUOUS
Status: ACTIVE | OUTPATIENT
Start: 2021-09-16 | End: 2021-09-16

## 2021-09-16 RX ORDER — NALOXONE HYDROCHLORIDE 0.4 MG/ML
0.4 INJECTION, SOLUTION INTRAMUSCULAR; INTRAVENOUS; SUBCUTANEOUS
Status: ACTIVE | OUTPATIENT
Start: 2021-09-16 | End: 2021-09-16

## 2021-09-16 RX ORDER — ATROPINE SULFATE 0.1 MG/ML
0.5 INJECTION INTRAVENOUS
Status: ACTIVE | OUTPATIENT
Start: 2021-09-16 | End: 2021-09-16

## 2021-09-16 RX ORDER — ONDANSETRON 4 MG/1
4 TABLET, ORALLY DISINTEGRATING ORAL EVERY 6 HOURS PRN
Status: DISCONTINUED | OUTPATIENT
Start: 2021-09-16 | End: 2021-09-17 | Stop reason: HOSPADM

## 2021-09-16 RX ORDER — ONDANSETRON 2 MG/ML
4 INJECTION INTRAMUSCULAR; INTRAVENOUS EVERY 6 HOURS PRN
Status: DISCONTINUED | OUTPATIENT
Start: 2021-09-16 | End: 2021-09-16 | Stop reason: HOSPADM

## 2021-09-16 RX ORDER — FLUMAZENIL 0.1 MG/ML
0.2 INJECTION, SOLUTION INTRAVENOUS
Status: ACTIVE | OUTPATIENT
Start: 2021-09-16 | End: 2021-09-16

## 2021-09-16 RX ORDER — ONDANSETRON 4 MG/1
4 TABLET, ORALLY DISINTEGRATING ORAL EVERY 6 HOURS PRN
Status: CANCELLED | OUTPATIENT
Start: 2021-09-16

## 2021-09-16 RX ORDER — ASPIRIN 81 MG/1
162 TABLET ORAL DAILY
Status: DISCONTINUED | OUTPATIENT
Start: 2021-09-16 | End: 2021-09-17 | Stop reason: HOSPADM

## 2021-09-16 RX ORDER — ACETAMINOPHEN 650 MG/1
650 SUPPOSITORY RECTAL EVERY 6 HOURS PRN
Status: DISCONTINUED | OUTPATIENT
Start: 2021-09-16 | End: 2021-09-16 | Stop reason: HOSPADM

## 2021-09-16 RX ORDER — NITROGLYCERIN 0.4 MG/1
0.4 TABLET SUBLINGUAL
Status: CANCELLED | OUTPATIENT
Start: 2021-09-16

## 2021-09-16 RX ORDER — ACETAMINOPHEN 325 MG/1
650 TABLET ORAL EVERY 4 HOURS PRN
Status: DISCONTINUED | OUTPATIENT
Start: 2021-09-16 | End: 2021-09-17 | Stop reason: HOSPADM

## 2021-09-16 RX ORDER — NICARDIPINE HYDROCHLORIDE 2.5 MG/ML
INJECTION INTRAVENOUS
Status: DISCONTINUED | OUTPATIENT
Start: 2021-09-16 | End: 2021-09-16 | Stop reason: HOSPADM

## 2021-09-16 RX ORDER — HEPARIN SODIUM 10000 [USP'U]/100ML
0-5000 INJECTION, SOLUTION INTRAVENOUS CONTINUOUS
Status: CANCELLED | OUTPATIENT
Start: 2021-09-16

## 2021-09-16 RX ORDER — MULTIPLE VITAMINS W/ MINERALS TAB 9MG-400MCG
1 TAB ORAL DAILY
COMMUNITY

## 2021-09-16 RX ORDER — HEPARIN SODIUM 10000 [USP'U]/100ML
0-5000 INJECTION, SOLUTION INTRAVENOUS CONTINUOUS
Status: DISCONTINUED | OUTPATIENT
Start: 2021-09-16 | End: 2021-09-16 | Stop reason: HOSPADM

## 2021-09-16 RX ORDER — CYCLOBENZAPRINE HCL 5 MG
5-10 TABLET ORAL 3 TIMES DAILY PRN
Status: DISCONTINUED | OUTPATIENT
Start: 2021-09-16 | End: 2021-09-17 | Stop reason: HOSPADM

## 2021-09-16 RX ORDER — SODIUM CHLORIDE, SODIUM LACTATE, POTASSIUM CHLORIDE, CALCIUM CHLORIDE 600; 310; 30; 20 MG/100ML; MG/100ML; MG/100ML; MG/100ML
INJECTION, SOLUTION INTRAVENOUS CONTINUOUS
Status: DISCONTINUED | OUTPATIENT
Start: 2021-09-16 | End: 2021-09-16 | Stop reason: HOSPADM

## 2021-09-16 RX ORDER — ACETAMINOPHEN 325 MG/1
650 TABLET ORAL EVERY 6 HOURS PRN
Status: DISCONTINUED | OUTPATIENT
Start: 2021-09-16 | End: 2021-09-16 | Stop reason: HOSPADM

## 2021-09-16 RX ORDER — ACETAMINOPHEN 650 MG/1
650 SUPPOSITORY RECTAL EVERY 6 HOURS PRN
Status: DISCONTINUED | OUTPATIENT
Start: 2021-09-16 | End: 2021-09-17 | Stop reason: HOSPADM

## 2021-09-16 RX ORDER — ACETAMINOPHEN 325 MG/1
650 TABLET ORAL EVERY 6 HOURS PRN
Status: CANCELLED | OUTPATIENT
Start: 2021-09-16

## 2021-09-16 RX ORDER — IODIXANOL 320 MG/ML
INJECTION, SOLUTION INTRAVASCULAR
Status: DISCONTINUED | OUTPATIENT
Start: 2021-09-16 | End: 2021-09-16 | Stop reason: HOSPADM

## 2021-09-16 RX ORDER — ONDANSETRON 2 MG/ML
4 INJECTION INTRAMUSCULAR; INTRAVENOUS EVERY 6 HOURS PRN
Status: CANCELLED | OUTPATIENT
Start: 2021-09-16

## 2021-09-16 RX ORDER — DIAZEPAM 5 MG
10 TABLET ORAL ONCE
Status: COMPLETED | OUTPATIENT
Start: 2021-09-16 | End: 2021-09-16

## 2021-09-16 RX ORDER — HEPARIN SODIUM 1000 [USP'U]/ML
INJECTION, SOLUTION INTRAVENOUS; SUBCUTANEOUS
Status: DISCONTINUED | OUTPATIENT
Start: 2021-09-16 | End: 2021-09-16 | Stop reason: HOSPADM

## 2021-09-16 RX ORDER — HYDRALAZINE HYDROCHLORIDE 20 MG/ML
10 INJECTION INTRAMUSCULAR; INTRAVENOUS
Status: DISCONTINUED | OUTPATIENT
Start: 2021-09-16 | End: 2021-09-17 | Stop reason: HOSPADM

## 2021-09-16 RX ORDER — OXYCODONE HYDROCHLORIDE 5 MG/1
5 TABLET ORAL EVERY 4 HOURS PRN
Status: DISCONTINUED | OUTPATIENT
Start: 2021-09-16 | End: 2021-09-17 | Stop reason: HOSPADM

## 2021-09-16 RX ORDER — SODIUM CHLORIDE, SODIUM LACTATE, POTASSIUM CHLORIDE, CALCIUM CHLORIDE 600; 310; 30; 20 MG/100ML; MG/100ML; MG/100ML; MG/100ML
INJECTION, SOLUTION INTRAVENOUS CONTINUOUS
Status: CANCELLED | OUTPATIENT
Start: 2021-09-16

## 2021-09-16 RX ORDER — MAGNESIUM OXIDE 400 MG/1
400 TABLET ORAL DAILY
COMMUNITY
End: 2021-11-09

## 2021-09-16 RX ORDER — METOPROLOL TARTRATE 25 MG/1
25 TABLET, FILM COATED ORAL ONCE
Status: COMPLETED | OUTPATIENT
Start: 2021-09-16 | End: 2021-09-16

## 2021-09-16 RX ORDER — ACETAMINOPHEN 650 MG/1
650 SUPPOSITORY RECTAL EVERY 6 HOURS PRN
Status: CANCELLED | OUTPATIENT
Start: 2021-09-16

## 2021-09-16 RX ORDER — FENTANYL CITRATE 50 UG/ML
INJECTION, SOLUTION INTRAMUSCULAR; INTRAVENOUS
Status: DISCONTINUED | OUTPATIENT
Start: 2021-09-16 | End: 2021-09-16 | Stop reason: HOSPADM

## 2021-09-16 RX ORDER — MAGNESIUM OXIDE 400 MG/1
400 TABLET ORAL DAILY
Status: DISCONTINUED | OUTPATIENT
Start: 2021-09-17 | End: 2021-09-17 | Stop reason: HOSPADM

## 2021-09-16 RX ORDER — NITROGLYCERIN 0.4 MG/1
0.4 TABLET SUBLINGUAL
Status: DISCONTINUED | OUTPATIENT
Start: 2021-09-16 | End: 2021-09-17 | Stop reason: HOSPADM

## 2021-09-16 RX ORDER — ONDANSETRON 2 MG/ML
4 INJECTION INTRAMUSCULAR; INTRAVENOUS EVERY 6 HOURS PRN
Status: DISCONTINUED | OUTPATIENT
Start: 2021-09-16 | End: 2021-09-17 | Stop reason: HOSPADM

## 2021-09-16 RX ORDER — NITROGLYCERIN 5 MG/ML
VIAL (ML) INTRAVENOUS
Status: DISCONTINUED | OUTPATIENT
Start: 2021-09-16 | End: 2021-09-16 | Stop reason: HOSPADM

## 2021-09-16 RX ADMIN — ACETAMINOPHEN 650 MG: 325 TABLET ORAL at 17:33

## 2021-09-16 RX ADMIN — ACETAMINOPHEN 650 MG: 325 TABLET ORAL at 10:44

## 2021-09-16 RX ADMIN — METOPROLOL TARTRATE 25 MG: 25 TABLET, FILM COATED ORAL at 01:48

## 2021-09-16 RX ADMIN — ASPIRIN 324 MG: 81 TABLET, CHEWABLE ORAL at 01:25

## 2021-09-16 RX ADMIN — DIAZEPAM 10 MG: 5 TABLET ORAL at 13:34

## 2021-09-16 RX ADMIN — SODIUM CHLORIDE, POTASSIUM CHLORIDE, SODIUM LACTATE AND CALCIUM CHLORIDE: 600; 310; 30; 20 INJECTION, SOLUTION INTRAVENOUS at 04:22

## 2021-09-16 RX ADMIN — SODIUM CHLORIDE 75 ML/HR: 9 INJECTION, SOLUTION INTRAVENOUS at 17:26

## 2021-09-16 RX ADMIN — HEPARIN SODIUM 1200 UNITS/HR: 10000 INJECTION, SOLUTION INTRAVENOUS at 01:30

## 2021-09-16 RX ADMIN — ASPIRIN 162 MG: 81 TABLET, COATED ORAL at 18:11

## 2021-09-16 RX ADMIN — PERFLUTREN 3 ML: 6.52 INJECTION, SUSPENSION INTRAVENOUS at 11:14

## 2021-09-16 ASSESSMENT — ACTIVITIES OF DAILY LIVING (ADL)
DRESSING/BATHING_DIFFICULTY: NO
WALKING_OR_CLIMBING_STAIRS_DIFFICULTY: NO
DIFFICULTY_COMMUNICATING: NO
CONCENTRATING,_REMEMBERING_OR_MAKING_DECISIONS_DIFFICULTY: NO
PATIENT_/_FAMILY_COMMUNICATION_STYLE: SPOKEN LANGUAGE (ENGLISH OR BILINGUAL)
WEAR_GLASSES_OR_BLIND: NO
FALL_HISTORY_WITHIN_LAST_SIX_MONTHS: NO
HEARING_DIFFICULTY_OR_DEAF: NO
DOING_ERRANDS_INDEPENDENTLY_DIFFICULTY: NO
DIFFICULTY_EATING/SWALLOWING: NO

## 2021-09-16 ASSESSMENT — MIFFLIN-ST. JEOR
SCORE: 1899.67
SCORE: 1837.53

## 2021-09-16 NOTE — PROGRESS NOTES
Coronary angiogram no intervention.  MRI consent complete.  Left message for MRI for time for test.  Gave telephone report to Brooklynn.  Brooklynn will call unit when bed is clean.   present.  No questions.  Spoke with Faiza to consult Hospitalist when arrive on floor.

## 2021-09-16 NOTE — PLAN OF CARE
Problem: Adult Inpatient Plan of Care  Goal: Plan of Care Review  Outcome: Adequate for Discharge       Patient is A/O x4 and independent in the room. No complaints of chest pain or SOB. Pt had an echo this morning and met with Cardiology. The plan is to transfer to [facility] to transport. Writer spoke with Pt and she is aware of the plan and has no further questions at this time.    Nara Sutton RN

## 2021-09-16 NOTE — DISCHARGE INSTRUCTIONS
Please follow-up through your PCP. Seems unlikely that your symptoms tonight were caused by your heart. If you have new or worsening symptoms, please return to the ED for repeat assessment.

## 2021-09-16 NOTE — PLAN OF CARE
Problem: Bleeding (Cardiac Catheterization)  Goal: Absence of Bleeding  Outcome: No Change   Patient status post left radial approach angiogram  TR band in place; upon 1st attempt to release 2cc of air quick blood release noted  Nurse Brooklynn on P3 notified  Patient transported to the unit for overnight observation

## 2021-09-16 NOTE — DISCHARGE INSTRUCTIONS
Interventional Cardiology  Coronary Angiogram/Angioplasty/Stent/Atherectomy Discharge  Instructions -   Radial (wrist) Approach     The instructions below are to help you understand how to take care of yourself. There is also information about when to call the doctor or emergency services.    **Do not stop your aspirin or platelet inhibitor unless directed by your Cardiologist.  These medications help to prevent platelets in your blood from sticking together and forming a clot.   Examples of these medications are: Ticagrelor (Brilinta), Clopidogrel (Plavix), Prasugrel (Effient)    For 24 hours after procedure:    Do not subject hand/arm to any forceful movements for 24 hours, such as supporting weight when rising from a chair or bed.    Do not drive a car for 24 hours.    The dressing on the puncture site may be removed after 24 hours and left open to air. If minor oozing, you may apply a Band-Aid and remove after 12 hours.     You may shower on the day after your procedure. Do not take a tub bath or wash dishes (no soaking wrist) with the puncture site in water for 3 days after the procedure.    For 48 hours following the procedure:    Do not operate a lawnmower, motorcycle, chainsaw or all-terrain vehicle.    Do not lift anything heavier than 5-10 pounds with affected arm for 5 days.    Avoid excessive bending (flexion/extension) wrist movement.    Do not engage in vigorous exercise (i.e. tennis, golf) using the affected arm for 5 days after discharge.    You may return to work in 72 hours if no complications and no heavy lifting.    If bleeding should occur following discharge:    Sit down and apply firm pressure with your thumb against the puncture site and fingers against back of wrist for 10 minutes.    If the bleeding stops, continue to rest, keeping your wrist still for 2 hours. Notify your doctor as soon as possible.    If bleeding does not stop after 10 minutes or if there is a large amount of bleeding or  spurting, call 911 immediately. Do not drive yourself to the hospital.           Contact the [facility] Clinic at [phone] if you develop:    Fever over 100.4, that lasts more than one day    Redness, heat, or pus at the puncture site    Change in color or temperature of the hand or arm    Expect mild tingling of hand and tenderness at the puncture site for up to 3 days. You may take Tylenol or a pain medicine recommended by your doctor.                         Your Procedural Physician was: Dr. Margarito Perez  the phone number is: [phone]    [facility]:  [phone]  If you are calling after hours, please listen to the entire voicemail, a live  will answer at the end of the message

## 2021-09-16 NOTE — PLAN OF CARE
Problem: Vascular Access Protection (Cardiac Catheterization)  Goal: Absence of Vascular Access Complication  Outcome: Improving       Problem: Arrhythmia/Dysrhythmia (Cardiac Catheterization)  Goal: Stable Heart Rate and Rhythm  Outcome: Improving     Pt arrived to unit from cath lab at 1710. Began removing air from TR band at 1800. No bleeding from site. TR band removed at 1918.    Tele rhythm is sinus arrhythmia.  Vitals stable. Pt denies any chest discomfort or nausea.      Pt received tylenol for headache, which was effective.    Plan for cardiac MRI tomorrow.     Priyanka Culp RN

## 2021-09-16 NOTE — PROGRESS NOTES
.  Hospitalist Progress Note    Assessment/Plan    Active Problems:    * No active hospital problems. *    NSTEMI  Troponins noted to be trending up hence pt was transferred  From  to [facility] for angiogram  EKG showed some T wave inversions  S/p angiogram which was unremarkable.   Concerned for possible myocarditis vrs SCAD. Planned for cardiac MRI  Continue asa,nitroglycerine prn  ECHO showed EF 65-70%    Obesity- encourage out-pt weight loss.    Barriers to Discharge:  Post procedure monitoring, cardiac MRI    Anticipated discharge date/Disposition: 1-2 days       Subjective  Chart reviewed and events noted.  Patient seen and examined.   Patient post angiogram. Denies any chest pain, sob,abd pain, N/V      Objective    Vital signs in last 24 hours  Temp:  [97.5  F (36.4  C)-98.5  F (36.9  C)] 98.5  F (36.9  C)  Pulse:  [60-94] 82  Resp:  [10-28] 28  BP: (113-152)/(51-77) 118/58  SpO2:  [98 %-100 %] 98 % [unfilled] O2 Device: None (Room air)    Weight:   [unfilled] Weight change:     Intake/Output last 3 shifts  No intake/output data recorded.  Body mass index is 41.33 kg/m .    Physical Exam    General Appearance:    Pleasant, obese female, alert, cooperative, no distress, appears stated age   Lungs:     CTAB   Cardiovascular:    RRR   Abdomen:     Soft, non-tender   Neurologic:   Grossly normal     Pertinent Labs   Lab Results: personally reviewed.   Recent Labs   Lab 09/15/21  2157      CO2 24   BUN 10   ALBUMIN 3.6   ALKPHOS 49   ALT 10   AST 11     Recent Labs   Lab 09/16/21 0440 09/15/21  2157   WBC 12.3* 12.0*   HGB 13.1 13.6   HCT 38.4 39.5    272     Recent Labs   Lab 09/16/21  0440 09/16/21  0022 09/15/21  2157   TROPONINI 2.83* 0.77* 0.03     Invalid input(s): POCGLUFGR    Medications  Drug and lactation database from the [place] [employer] of Medicine:  http://toxnet.nlm.nih.gov/cgi-bin/sis/htmlgen?LACT      Pertinent Radiology   Radiology Results:  Personally reviewed  impressions    Reviewed labs in last 24 hours.    Advanced Care Planning:  Discharge Planning discussed with patient and her spouse by bedside.  Total time with this patient is 35 min with greater than 50% of time spent in coordination of care.  Care discussed and coordinated with her care team.    This Note is created using dragon voice recognition software.  Errors in spelling or words which seems out of context are unintentional.  Sounds alike errors may have escaped editing.    Alicia Kurtz MD  Hospitalist

## 2021-09-16 NOTE — PLAN OF CARE
Pt transferred to [facility] for CA/P.PCI due to SOB and chest pain. Pt denies chest pain at this time. Pt prepped and ready for procedure.  Noble at bedside.      Aicha Eller RN

## 2021-09-16 NOTE — ED PROVIDER NOTES
EMERGENCY DEPARTMENT ENCOUNTER      NAME: Nancy Greene  AGE: 41 year old female  YOB: 1980  MRN: [id number]  EVALUATION DATE & TIME: 9/15/2021  9:14 PM    PCP: Deborah Mayes    ED PROVIDER: Dayron Paul PA-C      Chief Complaint   Patient presents with     Chest Pain         FINAL IMPRESSION:  1. Chest pain          MEDICAL DECISION MAKING:    Pertinent Labs & Imaging studies reviewed. (See chart for details)  41 year old female presents to the Emergency Department for evaluation of acute onset of substernal chest pain with radiation to the shoulders and neck approximately around 8:30 PM.    After obtaining history present illness, reviewing vitals and examining the patient plan to assess with EKG, screening labs which will include D-dimer and troponin, likely delta troponin and some form of imaging.    D-dimer did return within normal limits therefore we we will image with chest x-ray.  Initial set of labs are all unremarkable including troponin.  Because of the timing of symptoms we will get a delta troponin III hours from the first.    Patient signed out to Dr. Larson with repeat Troponin and final disposition pending.    ED COURSE  9:22 PM I met with the patient, obtained history, performed an initial exam, and discussed options and plan for diagnostics and treatment here in the ED. I saw the patient wearing an eye shield, surgical mask, gown, and gloves.       At the conclusion of the encounter I discussed the results of all of the tests and the disposition. The questions were answered. The patient or family acknowledged understanding and was agreeable with the care plan.     MEDICATIONS GIVEN IN THE EMERGENCY:  Medications   ketorolac (TORADOL) injection 30 mg (0 mg Intravenous Hold 9/15/21 6750)       NEW PRESCRIPTIONS STARTED AT TODAY'S ER VISIT  New Prescriptions    No medications on file             =================================================================    HPI    Patient information was obtained from: Patient    Use of Interpretor: N/A       Nancy Greene is a 41 year old female with a pertinent history of headache who presents to this ED by means of private car for evaluation of chest pain.     The patient reports sudden onset centralized chest pain that began at around 20:20 tonight. The patient reports the pain radiates into her breasts and down her left arm to her left wrist. She reports the pain started in her back and then quickly moved to her chest. She also reports the pain has been constant since onset. She endorses shortness of breath secondary to the pain. She is otherwise in her usual state of health and denies any tobacco use, history of blood clots, asthma, abdominal pain, or any other concerns at this time.       REVIEW OF SYSTEMS   Review of Systems   Respiratory: Positive for shortness of breath.    Cardiovascular: Positive for chest pain.   Gastrointestinal: Negative for abdominal pain.   All other systems reviewed and are negative.         PAST MEDICAL HISTORY:  Past Medical History:   Diagnosis Date     Headache      Headache      Headache(784.0)        PAST SURGICAL HISTORY:  Past Surgical History:   Procedure Laterality Date     BIOPSY       CHOLECYSTECTOMY, LAPOROSCOPIC  12/09    Cholecystectomy, Laparoscopic     HC REMOVAL OF TONSILS,<14 Y/O       ZZC NONSPECIFIC PROCEDURE  4/03    broken nose         CURRENT MEDICATIONS:      Current Facility-Administered Medications:      ketorolac (TORADOL) injection 30 mg, 30 mg, Intravenous, Once, Dayron Paul PA-C    Current Outpatient Medications:      Biotin 1000 MCG TABS tablet, Take 1,000 mcg by mouth daily., Disp: , Rfl:      CALCIUM 500 MG OR TABS, one daily, Disp: , Rfl: 0     cyclobenzaprine (FLEXERIL) 10 MG tablet, Take 0.5-1 tablets (5-10 mg) by mouth 3 times daily as needed for muscle spasms, Disp: 30  tablet, Rfl: 0     fish oil-omega-3 fatty acids (FISH OIL) 1000 MG capsule, Take 2,000 mg by mouth daily., Disp: , Rfl:      fluticasone (FLONASE) 50 MCG/ACT spray, Spray 2 sprays into both nostrils daily, Disp: 1 Bottle, Rfl: 11     folic acid (FOLVITE) 400 MCG tablet, Take 400 mcg by mouth daily., Disp: , Rfl:      MAGNESIUM PO, , Disp: , Rfl:      MULTIVITAMIN TABS   OR, 1 TABLET DAILY, Disp: , Rfl:       ALLERGIES:  Allergies   Allergen Reactions     No Known Allergies      Cetirizine Rash       FAMILY HISTORY:  Family History   Problem Relation Age of Onset     Gastrointestinal Disease Mother         ulcers     Neurologic Disorder Mother         migraines     Hyperlipidemia Father      Cardiovascular Paternal Grandfather         2 MI     Lipids Paternal Grandfather      Hypertension Paternal Grandfather      Circulatory Paternal Grandfather         blood clots in legs     Prostate Cancer Paternal Grandfather      Eye Disorder Paternal Grandfather         glaucoma     Diabetes Paternal Grandfather      Hyperlipidemia Paternal Grandfather      Other - See Comments Paternal Grandfather         enlarged heart and blood clotting issues. History of heart attacks     Lipids Maternal Grandmother      Hypertension Maternal Grandmother      Neurologic Disorder Maternal Grandmother         migraines/brain aneurysm     Respiratory Sister         asthma     Eye Disorder Paternal Grandmother         glaucoma     Breast Cancer Paternal Grandmother        SOCIAL HISTORY:   Social History     Socioeconomic History     Marital status:      Spouse name: Not on file     Number of children: Not on file     Years of education: Not on file     Highest education level: Not on file   Occupational History     Not on file   Tobacco Use     Smoking status: Never Smoker     Smokeless tobacco: Never Used   Substance and Sexual Activity     Alcohol use: Yes     Comment: 2/mo     Drug use: No     Sexual activity: Yes     Partners: Male      Birth control/protection: None     Comment: Depo provera-   Other Topics Concern      Service No     Blood Transfusions No     Caffeine Concern No     Occupational Exposure No     Hobby Hazards No     Sleep Concern No     Stress Concern No     Weight Concern Yes     Comment: trying to lose alter diet not going well     Special Diet No     Back Care No     Exercise Yes     Bike Helmet Yes     Seat Belt Yes     Self-Exams Yes     Parent/sibling w/ CABG, MI or angioplasty before 65F 55M? No   Social History Narrative    Dairy/d 1 servings/d. plus a calcium supplement    Caffeine 0 servings/d    Exercise 3 x week walks     Sunscreen used - Yes    Seatbelts used - Yes    Working smoke/CO detectors in the home - Yes    Guns stored in the home - No    Self Breast Exams - Yes    Self Testicular Exam - NA    Eye Exam up to date - yes    Dental Exam up to date - Yes    Pap Smear up to date -no    Mammogram up to date - NA    PSA up to date - NA    Dexa Scan up to date - NA    Flex Sig / Colonoscopy up to date - NA    Immunizations up to date - Yes-Td 2008    Abuse: Current or Past(Physical, Sexual or Emotional)- No    Do you feel safe in your environment - Yes                 Social Determinants of Health     Financial Resource Strain:      Difficulty of Paying Living Expenses:    Food Insecurity:      Worried About Running Out of Food in the Last Year:      Ran Out of Food in the Last Year:    Transportation Needs:      Lack of Transportation (Medical):      Lack of Transportation (Non-Medical):    Physical Activity:      Days of Exercise per Week:      Minutes of Exercise per Session:    Stress:      Feeling of Stress :    Social Connections:      Frequency of Communication with Friends and Family:      Frequency of Social Gatherings with Friends and Family:      Attends Mandaeism Services:      Active Member of Clubs or Organizations:      Attends Club or Organization Meetings:      Marital Status:     Intimate Partner Violence:      Fear of Current or Ex-Partner:      Emotionally Abused:      Physically Abused:      Sexually Abused:        VITALS:  Patient Vitals for the past 24 hrs:   BP Temp Temp src Pulse Resp SpO2   09/15/21 2252 130/71 -- -- 72 -- 100 %   09/15/21 2142 130/70 98  F (36.7  C) Oral 72 18 100 %       PHYSICAL EXAM    Physical Exam  Vitals and nursing note reviewed.   Constitutional:       General: She is not in acute distress.     Appearance: Normal appearance. She is not ill-appearing or toxic-appearing.   HENT:      Head: Normocephalic and atraumatic.      Right Ear: External ear normal.      Left Ear: External ear normal.      Nose: Nose normal.      Mouth/Throat:      Mouth: Mucous membranes are moist.      Pharynx: Oropharynx is clear. No oropharyngeal exudate or posterior oropharyngeal erythema.   Eyes:      General: No scleral icterus.        Right eye: No discharge.         Left eye: No discharge.      Extraocular Movements: Extraocular movements intact.      Conjunctiva/sclera: Conjunctivae normal.   Cardiovascular:      Rate and Rhythm: Normal rate and regular rhythm.      Pulses: Normal pulses.      Heart sounds: Normal heart sounds. No murmur heard.     Pulmonary:      Effort: Pulmonary effort is normal. No respiratory distress.      Breath sounds: Normal breath sounds. No stridor. No wheezing, rhonchi or rales.   Chest:      Chest wall: Tenderness present.      Comments: Reproducible chest wall tenderness over the sternum no gross evidence of trauma.  Abdominal:      General: Abdomen is flat. Bowel sounds are normal. There is no distension.      Palpations: Abdomen is soft.      Tenderness: There is no abdominal tenderness. There is no guarding or rebound.   Musculoskeletal:         General: No swelling, tenderness, deformity or signs of injury. Normal range of motion.      Cervical back: Normal range of motion and neck supple. No rigidity or tenderness.   Lymphadenopathy:       Cervical: No cervical adenopathy.   Skin:     General: Skin is warm and dry.      Coloration: Skin is not jaundiced.      Findings: No bruising, erythema or rash.   Neurological:      General: No focal deficit present.      Mental Status: She is alert and oriented to person, place, and time. Mental status is at baseline.      Cranial Nerves: No cranial nerve deficit.      Sensory: No sensory deficit.      Motor: No weakness.      Gait: Gait normal.   Psychiatric:         Mood and Affect: Mood normal.         Behavior: Behavior normal.         Judgment: Judgment normal.          LAB:  All pertinent labs reviewed and interpreted.  Results for orders placed or performed during the hospital encounter of 09/15/21   XR Chest 2 Views    Impression    IMPRESSION: No evidence of active cardiopulmonary disease.    Result Value Ref Range    Troponin I 0.03 0.00 - 0.29 ng/mL   CBC with platelets and differential   Result Value Ref Range    WBC Count 12.0 (H) 4.0 - 11.0 10e3/uL    RBC Count 4.63 3.80 - 5.20 10e6/uL    Hemoglobin 13.6 11.7 - 15.7 g/dL    Hematocrit 39.5 35.0 - 47.0 %    MCV 85 78 - 100 fL    MCH 29.4 26.5 - 33.0 pg    MCHC 34.4 31.5 - 36.5 g/dL    RDW 12.8 10.0 - 15.0 %    Platelet Count 272 150 - 450 10e3/uL    % Neutrophils 75 %    % Lymphocytes 19 %    % Monocytes 5 %    % Eosinophils 1 %    % Basophils 0 %    % Immature Granulocytes 0 %    NRBCs per 100 WBC 0 <1 /100    Absolute Neutrophils 8.8 (H) 1.6 - 8.3 10e3/uL    Absolute Lymphocytes 2.3 0.8 - 5.3 10e3/uL    Absolute Monocytes 0.6 0.0 - 1.3 10e3/uL    Absolute Eosinophils 0.1 0.0 - 0.7 10e3/uL    Absolute Basophils 0.1 0.0 - 0.2 10e3/uL    Absolute Immature Granulocytes 0.1 (H) <=0.0 10e3/uL    Absolute NRBCs 0.0 10e3/uL   Comprehensive metabolic panel   Result Value Ref Range    Sodium 140 136 - 145 mmol/L    Potassium 4.0 3.5 - 5.0 mmol/L    Chloride 107 98 - 107 mmol/L    Carbon Dioxide (CO2) 24 22 - 31 mmol/L    Anion Gap 9 5 - 18 mmol/L    Urea  Nitrogen 10 8 - 22 mg/dL    Creatinine 0.86 0.60 - 1.10 mg/dL    Calcium 8.9 8.5 - 10.5 mg/dL    Glucose 128 (H) 70 - 125 mg/dL    Alkaline Phosphatase 49 45 - 120 U/L    AST 11 0 - 40 U/L    ALT 10 0 - 45 U/L    Protein Total 6.7 6.0 - 8.0 g/dL    Albumin 3.6 3.5 - 5.0 g/dL    Bilirubin Total 0.5 0.0 - 1.0 mg/dL    GFR Estimate 85 >60 mL/min/1.73m2   Result Value Ref Range    Lipase 64 (H) 0 - 52 U/L   D dimer quantitative   Result Value Ref Range    D-Dimer Quantitative <=0.27 0.00 - 0.50 ug/mL FEU       RADIOLOGY:  Reviewed all pertinent imaging. Please see official radiology report.  XR Chest 2 Views   Preliminary Result   IMPRESSION: No evidence of active cardiopulmonary disease.           EKG:    Performed at: 2110    The EKG showing sinus rate of cardia at a rate of 58 bpm.  ST segments are normal with no acute elevation or depression.  T waves somewhat flat but upright.  QTC measured at 420.  No previous EKG for comparison.    I have independently reviewed and interpreted the EKG(s) documented above.            I, Antwan Garcia, am serving as a scribe to document services personally performed by Dayron Paul PA-C based on my observation and the provider's statements to me. IDayron PA-C attest that Antwan Garcia is acting in a scribe capacity, has observed my performance of the services and has documented them in accordance with my direction.    Dayron Paul PA-C  Emergency Medicine  [facility]     Dayron Paul PA-C  09/15/21 8362

## 2021-09-16 NOTE — ED TRIAGE NOTES
Patient is here with mid chest pain that started in her back. The pain is radiating down her arm. The pain is constant. She stated that nothing makes this better or worse. No trauma noted. Denies shortness of breath and is nauseated. EMS came to her house and checked her over. They said it was NSR with stable vital signs.

## 2021-09-16 NOTE — H&P
"Hospital Medicine Service History and Physical  [facility]: [facility]    Nancy Greene is a 41 year old female who  has a past medical history of Headache, Headache, and Headache(784.0).   Chief Complaint: Chest pain    Assessment and Plan  NSTEMI  Troponin 0.77  Anterior T wave inversion on EKG  Has received aspirin and heparin gtt  Consult cardiology  N.p.o. for now  Order echo  Trend troponin  As needed nitroglycerin    BMI 40    DVTP: Heparin as above  Code Status: Prior confirmed full  Disposition: Observation   Estimated body mass index is 40.03 kg/m  as calculated from the following:    Height as of 1/29/19: 1.676 m (5' 6\").    Weight as of this encounter: 112.5 kg (248 lb).    History of Present Illness  Nancy Greene says she was getting her son ready for bed around 8 PM when she developed chest and back discomfort.  She had associated shortness of breath, nausea without vomiting, and some slight diaphoresis.  She also had radiation of numbness/tingling to the bilateral jaw and the left wrist.  At this point her symptoms have resolved.  She speaks comfortably and is even laughing during our exam.  She denies recent medication changes.  She does not use alcohol, tobacco or drugs.  Denies a family history of cardiac disease although she does have family history of hypertension and hyperlipidemia in her grandfathers. No urinary change, edema, lightheadedness, abdominal pain.All other systems reviewed and are negative    In the ED, Nancy Greene presents afebrile, hemodynamically stable, stable on room air.  Metabolic panel fairly normal, negative LFTs, lipase 64 initial troponin 0.03 increased to 0.77.  Mild leukocytosis 12 K, no anemia.  Negative D-dimer.  Chest x-ray negative.  EKG showed anterior T wave inversion, otherwise sinus.  Received aspirin, Toradol, metoprolol, started on heparin drip.  Has as needed nitroglycerin ordered. Discussed with cardiology " who said WW is okay on heparin gtt. Anticipate transfer tomorrow.    Appears NAD  Anicteric conjunctiva, PERRL  Moist mucous membranes, malaligned but intact dentition  Trachea midline, no JVD  Clear to auscultation, Respiratory effort normal  Regular rate and rhythm, no murmur  Abdomen soft, nondistended, nontender  No edema, clubbing absent  Skin normal temperature, dry  Affable, normal affect, alert  Vital signs reviewed by me    Wt Readings from Last 4 Encounters:   09/16/21 112.5 kg (248 lb)   01/20/21 110.2 kg (243 lb)   05/09/18 107.4 kg (236 lb 12.8 oz)   03/28/18 108.5 kg (239 lb 4 oz)        reports that she has never smoked. She has never used smokeless tobacco. She reports current alcohol use. She reports that she does not use drugs.  family history includes Breast Cancer in her paternal grandmother; Cardiovascular in her paternal grandfather; Circulatory in her paternal grandfather; Diabetes in her paternal grandfather; Eye Disorder in her paternal grandfather and paternal grandmother; Gastrointestinal Disease in her mother; Hyperlipidemia in her father and paternal grandfather; Hypertension in her maternal grandmother and paternal grandfather; Lipids in her maternal grandmother and paternal grandfather; Neurologic Disorder in her maternal grandmother and mother; Other - See Comments in her paternal grandfather; Prostate Cancer in her paternal grandfather; Respiratory in her sister.   has a past surgical history that includes NONSPECIFIC PROCEDURE (4/03); REMOVAL OF TONSILS,<14 Y/O; cholecystectomy, laporoscopic (12/09); and biopsy.   Allergies   Allergen Reactions     No Known Allergies      Cetirizine Rash       Erickson Kelley MD, MPH  [facility]   Phone: #[phone]

## 2021-09-16 NOTE — ED PROVIDER NOTES
"ED CONSULTATION  Date/Time:9/15/2021 11:02 PM    I am seeing this patient along with PRANAY De Los Santos.  I, Geneva Larson MD have reviewed the documentation, personally taken the patient's history, performed an exam and agree with the physical finds, diagnosis and management plan.    ED Course:  1:18 AM I rechecked and updated the patient. The patient states she is currently pain free.  1:35 AM Shawna charge RN, attempted to locate bed at [facility]. There are no available cardiac telemetry beds.   1:38 AM I discussed the case with Cardiology, Dr Yee. Aside from aspirin and heparin, they advised addition of metoprolol tartrate 25-mg BID. I discussed that there are no beds for admission to a hospital with cath lab capabilities. Recommend holding in the ED or admission here with likely transfer tomorrow.   2:21 AM House lead was consulted, and given the indefinite nature of not knowing when a bed will open at another hospital, would recommend admission to [facility] and transfer as necessary tomorrow.   2:23 AM I discussed the case with hospitalist, Erickson Ortiz, who accepts the patient.     HPI:     Nancy Greene is a 41 year old female with a pertinent history of headache who presents to this ED by means of private car for evaluation of chest pain. The patient reports sudden onset centralized chest pain that began at around 20:20 tonight. The patient reports the pain radiates into her breasts and down her left arm to her left wrist. She reports the pain started in her back and then quickly moved to her chest.  She describes the pain in her back as being above her bra line and being a \"rushing\" pain.  He then developed a tightness in her central chest.  It radiated bilaterally and currently is a minimal, mild pain in her left anterior chest. She also reports the pain has been constant since onset. She endorses shortness of breath " "secondary to the pain. She is otherwise in her usual state of health and denies any tobacco use, history of blood clots, asthma, abdominal pain, or any other concerns at this time.     Patient with no family history of premature CAD.  Grandfather with history of PE and cardiomegaly.  Patient with no personal history of hypertension, hyperlipidemia, diabetes.  No history of tobacco use.    The creation of this record is based on the scribe s observations of the work being performed by Geneva Larson MD and the provider s statements to them. It was created on her behalf by Wing Basurto a trained medical scribe. This document has been checked and approved by the attending provider.    Physical Exam:/68 (BP Location: Left arm)   Pulse 66   Temp 97.5  F (36.4  C) (Oral)   Resp 20   Ht 1.702 m (5' 7\")   Wt 113.5 kg (250 lb 3.2 oz)   LMP 09/01/2021   SpO2 98%   BMI 39.19 kg/m    Constitutional: Well developed, well nourished, no acute distress.  EYES: Conjunctivae clear  HENT: Atraumatic, external ears normal, nose normal, oropharynx moist. Neck- supple   Respiratory: No respiratory distress, normal breath sounds, no rales, no wheezing   Cardiovascular: Normal rate, normal rhythm, no murmurs, no gallops, no rubs. No chest tenderness. Symmetric radial pulses bilaterally.   GI: Soft, nondistended, normal bowel sounds, nontender, no organomegaly, no masses, no rebound, no guarding   : No CVA tenderness  Musculoskeletal: No edema, No tenderness, No cyanosis, No clubbing. Good range of motion in all major joints. No tenderness to palpation or major deformities noted.   Integument: Warm, Dry, No erythema, No rash.   Neurologic: Alert & oriented x 3, no focal deficits noted, ambulatory  Psych: Affect normal, Judgment normal, Mood normal.    MDM: Patient presents with upper chest pain and back pain.  D-dimer is negative.  Troponin is negative.  My suspicion for ACS is low.Patient with a heart score of 1.  ECG " with no acute ischemic changes.  Plan for repeat troponin at 3 hours, if negative, the patient will be discharged home.      EKG:  Performed at: 15-SEP-2021  21:10:54  Impression: Sinus bradycardia. Otherwise normal ECG. No previous ECGs available.     Rate: 58 BPM  AK Interval: 144 ms  QRS Duration: 78 ms  Qtc Interval: 420 ms  No previous available for comparison.    I have independently reviewed and interpreted this ECG and agree with the above findings. See scanned document for further details.     Performed at: 16-SEP-2021  01:22:16  Impression: Sinus rhythm with sinus arrhythmia.    Rate: 73 BPM  AK Interval: 128 ms  QRS Duration: 80 ms  Qtc Interval: 456 ms  Comparison: Compared to previous from  15-SEP-2021  21:10, T wave inversions worse in Anterior leads.    I have independently reviewed and interpreted this ECG and agree with the above findings. See scanned document for further details.          Results for orders placed or performed during the hospital encounter of 09/15/21   XR Chest 2 Views    Impression    IMPRESSION: No evidence of active cardiopulmonary disease.    Result Value Ref Range    Troponin I 0.03 0.00 - 0.29 ng/mL   CBC with platelets and differential   Result Value Ref Range    WBC Count 12.0 (H) 4.0 - 11.0 10e3/uL    RBC Count 4.63 3.80 - 5.20 10e6/uL    Hemoglobin 13.6 11.7 - 15.7 g/dL    Hematocrit 39.5 35.0 - 47.0 %    MCV 85 78 - 100 fL    MCH 29.4 26.5 - 33.0 pg    MCHC 34.4 31.5 - 36.5 g/dL    RDW 12.8 10.0 - 15.0 %    Platelet Count 272 150 - 450 10e3/uL    % Neutrophils 75 %    % Lymphocytes 19 %    % Monocytes 5 %    % Eosinophils 1 %    % Basophils 0 %    % Immature Granulocytes 0 %    NRBCs per 100 WBC 0 <1 /100    Absolute Neutrophils 8.8 (H) 1.6 - 8.3 10e3/uL    Absolute Lymphocytes 2.3 0.8 - 5.3 10e3/uL    Absolute Monocytes 0.6 0.0 - 1.3 10e3/uL    Absolute Eosinophils 0.1 0.0 - 0.7 10e3/uL    Absolute Basophils 0.1 0.0 - 0.2 10e3/uL    Absolute Immature Granulocytes 0.1  (H) <=0.0 10e3/uL    Absolute NRBCs 0.0 10e3/uL   Comprehensive metabolic panel   Result Value Ref Range    Sodium 140 136 - 145 mmol/L    Potassium 4.0 3.5 - 5.0 mmol/L    Chloride 107 98 - 107 mmol/L    Carbon Dioxide (CO2) 24 22 - 31 mmol/L    Anion Gap 9 5 - 18 mmol/L    Urea Nitrogen 10 8 - 22 mg/dL    Creatinine 0.86 0.60 - 1.10 mg/dL    Calcium 8.9 8.5 - 10.5 mg/dL    Glucose 128 (H) 70 - 125 mg/dL    Alkaline Phosphatase 49 45 - 120 U/L    AST 11 0 - 40 U/L    ALT 10 0 - 45 U/L    Protein Total 6.7 6.0 - 8.0 g/dL    Albumin 3.6 3.5 - 5.0 g/dL    Bilirubin Total 0.5 0.0 - 1.0 mg/dL    GFR Estimate 85 >60 mL/min/1.73m2   Result Value Ref Range    Lipase 64 (H) 0 - 52 U/L   D dimer quantitative   Result Value Ref Range    D-Dimer Quantitative <=0.27 0.00 - 0.50 ug/mL FEU   Result Value Ref Range    Troponin I 0.77 (HH) 0.00 - 0.29 ng/mL   Asymptomatic COVID-19 Virus (Coronavirus) by PCR Nasopharyngeal    Specimen: Nasopharyngeal; Swab   Result Value Ref Range    SARS CoV2 PCR Negative Negative   CBC with platelets   Result Value Ref Range    WBC Count 12.3 (H) 4.0 - 11.0 10e3/uL    RBC Count 4.52 3.80 - 5.20 10e6/uL    Hemoglobin 13.1 11.7 - 15.7 g/dL    Hematocrit 38.4 35.0 - 47.0 %    MCV 85 78 - 100 fL    MCH 29.0 26.5 - 33.0 pg    MCHC 34.1 31.5 - 36.5 g/dL    RDW 12.8 10.0 - 15.0 %    Platelet Count 273 150 - 450 10e3/uL   Result Value Ref Range    Troponin I 2.83 (HH) 0.00 - 0.29 ng/mL     [unfilled]    1. Chest pain    2. NSTEMI (non-ST elevated myocardial infarction) (H)          Current Discharge Medication List          Final disposition will be per the depending diagnostic studies and patient's clinical trajectory.    This is an accurate record of my words and actions during this visit as documented by the scribe.

## 2021-09-16 NOTE — CONSULTS
Thank you, Dr. Kelley, for asking the [facility] team to see Ms. Nancy Greene for evaluation of chest pain and elevated troponin.      Assessment/Recommendations   Assessment/Plan:  1.  Acute non-ST elevation MI: The patient developed sudden onset of chest pain, associated with nausea and shortness of breath.  Her initial ECG was normal, and then had dynamic ECG change indicating anterior myocardial ischemia.  Her troponin initially was normal and then trending up to 2.88.  The patient has no fever, chills, cough.  The patient has no other acute event or significant change recently.  The patient could have acute coronary syndrome.  We discussed further evaluation and management.  Coronary angiogram with possible PCI is recommended.  The patient understands the benefit and the possible complications of from the procedures.  She is aware that the risks of the procedure include but are not limited to: death, myocardial infarction, stroke, kidney dysfunction, vessel trauma, hemorrhage, need for emergency corrective surgery, allergy, and dysrhythmia and that treatment alternatives include medications, percutaneous intervention, and cardiac surgery singly or in combination.   Coronary angiogram with possible PCI is ordered.  Cath Lab is called.  Continue aspirin, metoprolol and heparin infusion.  Lipid profile is ordered.  Start statins based on coronary angiogram findings and the lipid level today.  Echocardiogram is requested for evaluation of heart function and structure.         History of Present Illness/Subjective    It is my pleasure to see Nancy Greene at the [facility] clinic for evaluation of chest pain and elevated troponin.  Nancy Greene is a 41 year old female with a medical history of morbid obesity.    The patient developed a sudden onset of chest pain last night 8:00.  She states that initially she developed upper back pain and then quickly  "moved to anterior chest, radiated to left jaw and left arm.  She described jumping pain and pressure pain, 10/10 intensity, associated with nausea and shortness of breath, no diaphoresis.  Her chest pain lasted more than an hour before she arrived in the emergency room.  Her chest pain was resolved more than 2 hours after she received medication in the emergency room.  She had no palpitations, dizziness, orthopnea, PND or leg edema.  She did not have a similar chest pain in the past.    Her ECG initially was normal, then developed ST depression in anterior leads indicating myocardial ischemia.  Her troponin initially was normal, then elevated, trending up to 2.88 this morning.  She is chest pain-free at this time.  She is on heparin infusion.  She received aspirin.       Physical Examination Review of Systems   /64 (BP Location: Left arm)   Pulse 93   Temp 97.9  F (36.6  C) (Oral)   Resp 20   Ht 1.702 m (5' 7\")   Wt 113.5 kg (250 lb 3.2 oz)   LMP 09/01/2021   SpO2 98%   BMI 39.19 kg/m    Body mass index is 39.19 kg/m .  Wt Readings from Last 3 Encounters:   09/16/21 113.5 kg (250 lb 3.2 oz)   01/20/21 110.2 kg (243 lb)   05/09/18 107.4 kg (236 lb 12.8 oz)     No intake or output data in the 24 hours ending 09/16/21 0830    General Appearance:   Awake, Alert, No acute distress.   HEENT:  No scleral icterus; the mucous membranes were moist.   Neck: No cervical bruits. No JVD. No thyromegaly.    Chest: The spine was straight. The chest was symmetric.   Lungs:   Respirations unlabored; Lungs are clear to auscultation. No crackles.  No wheezing.   Cardiovascular:   Regular rhythm and rate, normal first and second heart sounds with no murmurs. No rubs or gallops.    Abdomen:  Obese. Soft. No tenderness. Bowels sounds are present   Extremities: Equal tibial pulses. No leg edema.   Skin: No rashes. Warm, Dry.   Musculoskeletal: No tenderness.   Neurologic: Oriented x 3. Mood and affect are appropriate.     A " 12 point comprehensive review of systems was negative except as noted.        Medical History  Surgical History Family History Social History   Past Medical History:   Diagnosis Date     Headache      Headache      Headache(784.0)      NSTEMI (non-ST elevated myocardial infarction) (H) 9/16/2021    Past Surgical History:   Procedure Laterality Date     BIOPSY       CHOLECYSTECTOMY, LAPOROSCOPIC  12/09    Cholecystectomy, Laparoscopic     HC REMOVAL OF TONSILS,<12 Y/O       ZZC NONSPECIFIC PROCEDURE  4/03    broken nose    Family History   Problem Relation Age of Onset     Gastrointestinal Disease Mother         ulcers     Neurologic Disorder Mother         migraines     Hyperlipidemia Father      Cardiovascular Paternal Grandfather         2 MI     Lipids Paternal Grandfather      Hypertension Paternal Grandfather      Circulatory Paternal Grandfather         blood clots in legs     Prostate Cancer Paternal Grandfather      Eye Disorder Paternal Grandfather         glaucoma     Diabetes Paternal Grandfather      Hyperlipidemia Paternal Grandfather      Other - See Comments Paternal Grandfather         enlarged heart and blood clotting issues. History of heart attacks     Lipids Maternal Grandmother      Hypertension Maternal Grandmother      Neurologic Disorder Maternal Grandmother         migraines/brain aneurysm     Respiratory Sister         asthma     Eye Disorder Paternal Grandmother         glaucoma     Breast Cancer Paternal Grandmother     Social History     Socioeconomic History     Marital status:      Spouse name: Not on file     Number of children: Not on file     Years of education: Not on file     Highest education level: Not on file   Occupational History     Not on file   Tobacco Use     Smoking status: Never Smoker     Smokeless tobacco: Never Used   Substance and Sexual Activity     Alcohol use: Yes     Comment: 2/mo     Drug use: No     Sexual activity: Yes     Partners: Male     Birth  control/protection: None     Comment: Depo provera-   Other Topics Concern      Service No     Blood Transfusions No     Caffeine Concern No     Occupational Exposure No     Hobby Hazards No     Sleep Concern No     Stress Concern No     Weight Concern Yes     Comment: trying to lose alter diet not going well     Special Diet No     Back Care No     Exercise Yes     Bike Helmet Yes     Seat Belt Yes     Self-Exams Yes     Parent/sibling w/ CABG, MI or angioplasty before 65F 55M? No   Social History Narrative    Dairy/d 1 servings/d. plus a calcium supplement    Caffeine 0 servings/d    Exercise 3 x week walks     Sunscreen used - Yes    Seatbelts used - Yes    Working smoke/CO detectors in the home - Yes    Guns stored in the home - No    Self Breast Exams - Yes    Self Testicular Exam - NA    Eye Exam up to date - yes    Dental Exam up to date - Yes    Pap Smear up to date -no    Mammogram up to date - NA    PSA up to date - NA    Dexa Scan up to date - NA    Flex Sig / Colonoscopy up to date - NA    Immunizations up to date - Yes-Td 2008    Abuse: Current or Past(Physical, Sexual or Emotional)- No    Do you feel safe in your environment - Yes                 Social Determinants of Health     Financial Resource Strain:      Difficulty of Paying Living Expenses:    Food Insecurity:      Worried About Running Out of Food in the Last Year:      Ran Out of Food in the Last Year:    Transportation Needs:      Lack of Transportation (Medical):      Lack of Transportation (Non-Medical):    Physical Activity:      Days of Exercise per Week:      Minutes of Exercise per Session:    Stress:      Feeling of Stress :    Social Connections:      Frequency of Communication with Friends and Family:      Frequency of Social Gatherings with Friends and Family:      Attends Shinto Services:      Active Member of Clubs or Organizations:      Attends Club or Organization Meetings:      Marital Status:    Intimate  Partner Violence:      Fear of Current or Ex-Partner:      Emotionally Abused:      Physically Abused:      Sexually Abused:           Medications  Allergies   Scheduled Meds:  Continuous Infusions:    heparin 1,200 Units/hr (09/16/21 0405)     lactated ringers 100 mL/hr at 09/16/21 0618     PRN Meds:.acetaminophen **OR** acetaminophen, melatonin, nitroGLYcerin, ondansetron **OR** ondansetron Allergies   Allergen Reactions     No Known Allergies      Cetirizine Rash         Lab Results    Chemistry/lipid CBC Cardiac Enzymes/BNP/TSH/INR   Lab Results   Component Value Date    CHOL 159 04/10/2009    HDL 35 (L) 04/10/2009    TRIG 116 04/10/2009    BUN 10 09/15/2021     09/15/2021    CO2 24 09/15/2021    Lab Results   Component Value Date    WBC 12.3 (H) 09/16/2021    HGB 13.1 09/16/2021    HCT 38.4 09/16/2021    MCV 85 09/16/2021     09/16/2021    Lab Results   Component Value Date    TROPONINI 2.83 (HH) 09/16/2021    TSH 1.69 01/16/2017

## 2021-09-16 NOTE — H&P (VIEW-ONLY)
Thank you, Dr. Kelley, for asking the [facility] team to see Ms. Nancy Greene for evaluation of chest pain and elevated troponin.      Assessment/Recommendations   Assessment/Plan:  1.  Acute non-ST elevation MI: The patient developed sudden onset of chest pain, associated with nausea and shortness of breath.  Her initial ECG was normal, and then had dynamic ECG change indicating anterior myocardial ischemia.  Her troponin initially was normal and then trending up to 2.88.  The patient has no fever, chills, cough.  The patient has no other acute event or significant change recently.  The patient could have acute coronary syndrome.  We discussed further evaluation and management.  Coronary angiogram with possible PCI is recommended.  The patient understands the benefit and the possible complications of from the procedures.  She is aware that the risks of the procedure include but are not limited to: death, myocardial infarction, stroke, kidney dysfunction, vessel trauma, hemorrhage, need for emergency corrective surgery, allergy, and dysrhythmia and that treatment alternatives include medications, percutaneous intervention, and cardiac surgery singly or in combination.   Coronary angiogram with possible PCI is ordered.  Cath Lab is called.  Continue aspirin, metoprolol and heparin infusion.  Lipid profile is ordered.  Start statins based on coronary angiogram findings and the lipid level today.  Echocardiogram is requested for evaluation of heart function and structure.         History of Present Illness/Subjective    It is my pleasure to see Nancy Greene at the [facility] [place] [facility] clinic for evaluation of chest pain and elevated troponin.  Nancy Greene is a 41 year old female with a medical history of morbid obesity.    The patient developed a sudden onset of chest pain last night 8:00.  She states that initially she developed upper back pain and then quickly  "moved to anterior chest, radiated to left jaw and left arm.  She described jumping pain and pressure pain, 10/10 intensity, associated with nausea and shortness of breath, no diaphoresis.  Her chest pain lasted more than an hour before she arrived in the emergency room.  Her chest pain was resolved more than 2 hours after she received medication in the emergency room.  She had no palpitations, dizziness, orthopnea, PND or leg edema.  She did not have a similar chest pain in the past.    Her ECG initially was normal, then developed ST depression in anterior leads indicating myocardial ischemia.  Her troponin initially was normal, then elevated, trending up to 2.88 this morning.  She is chest pain-free at this time.  She is on heparin infusion.  She received aspirin.       Physical Examination Review of Systems   /64 (BP Location: Left arm)   Pulse 93   Temp 97.9  F (36.6  C) (Oral)   Resp 20   Ht 1.702 m (5' 7\")   Wt 113.5 kg (250 lb 3.2 oz)   LMP 09/01/2021   SpO2 98%   BMI 39.19 kg/m    Body mass index is 39.19 kg/m .  Wt Readings from Last 3 Encounters:   09/16/21 113.5 kg (250 lb 3.2 oz)   01/20/21 110.2 kg (243 lb)   05/09/18 107.4 kg (236 lb 12.8 oz)     No intake or output data in the 24 hours ending 09/16/21 0830    General Appearance:   Awake, Alert, No acute distress.   HEENT:  No scleral icterus; the mucous membranes were moist.   Neck: No cervical bruits. No JVD. No thyromegaly.    Chest: The spine was straight. The chest was symmetric.   Lungs:   Respirations unlabored; Lungs are clear to auscultation. No crackles.  No wheezing.   Cardiovascular:   Regular rhythm and rate, normal first and second heart sounds with no murmurs. No rubs or gallops.    Abdomen:  Obese. Soft. No tenderness. Bowels sounds are present   Extremities: Equal tibial pulses. No leg edema.   Skin: No rashes. Warm, Dry.   Musculoskeletal: No tenderness.   Neurologic: Oriented x 3. Mood and affect are appropriate.     A " 12 point comprehensive review of systems was negative except as noted.        Medical History  Surgical History Family History Social History   Past Medical History:   Diagnosis Date     Headache      Headache      Headache(784.0)      NSTEMI (non-ST elevated myocardial infarction) (H) 9/16/2021    Past Surgical History:   Procedure Laterality Date     BIOPSY       CHOLECYSTECTOMY, LAPOROSCOPIC  12/09    Cholecystectomy, Laparoscopic     HC REMOVAL OF TONSILS,<12 Y/O       ZZC NONSPECIFIC PROCEDURE  4/03    broken nose    Family History   Problem Relation Age of Onset     Gastrointestinal Disease Mother         ulcers     Neurologic Disorder Mother         migraines     Hyperlipidemia Father      Cardiovascular Paternal Grandfather         2 MI     Lipids Paternal Grandfather      Hypertension Paternal Grandfather      Circulatory Paternal Grandfather         blood clots in legs     Prostate Cancer Paternal Grandfather      Eye Disorder Paternal Grandfather         glaucoma     Diabetes Paternal Grandfather      Hyperlipidemia Paternal Grandfather      Other - See Comments Paternal Grandfather         enlarged heart and blood clotting issues. History of heart attacks     Lipids Maternal Grandmother      Hypertension Maternal Grandmother      Neurologic Disorder Maternal Grandmother         migraines/brain aneurysm     Respiratory Sister         asthma     Eye Disorder Paternal Grandmother         glaucoma     Breast Cancer Paternal Grandmother     Social History     Socioeconomic History     Marital status:      Spouse name: Not on file     Number of children: Not on file     Years of education: Not on file     Highest education level: Not on file   Occupational History     Not on file   Tobacco Use     Smoking status: Never Smoker     Smokeless tobacco: Never Used   Substance and Sexual Activity     Alcohol use: Yes     Comment: 2/mo     Drug use: No     Sexual activity: Yes     Partners: Male     Birth  control/protection: None     Comment: Depo provera-   Other Topics Concern      Service No     Blood Transfusions No     Caffeine Concern No     Occupational Exposure No     Hobby Hazards No     Sleep Concern No     Stress Concern No     Weight Concern Yes     Comment: trying to lose alter diet not going well     Special Diet No     Back Care No     Exercise Yes     Bike Helmet Yes     Seat Belt Yes     Self-Exams Yes     Parent/sibling w/ CABG, MI or angioplasty before 65F 55M? No   Social History Narrative    Dairy/d 1 servings/d. plus a calcium supplement    Caffeine 0 servings/d    Exercise 3 x week walks     Sunscreen used - Yes    Seatbelts used - Yes    Working smoke/CO detectors in the home - Yes    Guns stored in the home - No    Self Breast Exams - Yes    Self Testicular Exam - NA    Eye Exam up to date - yes    Dental Exam up to date - Yes    Pap Smear up to date -no    Mammogram up to date - NA    PSA up to date - NA    Dexa Scan up to date - NA    Flex Sig / Colonoscopy up to date - NA    Immunizations up to date - Yes-Td 2008    Abuse: Current or Past(Physical, Sexual or Emotional)- No    Do you feel safe in your environment - Yes                 Social Determinants of Health     Financial Resource Strain:      Difficulty of Paying Living Expenses:    Food Insecurity:      Worried About Running Out of Food in the Last Year:      Ran Out of Food in the Last Year:    Transportation Needs:      Lack of Transportation (Medical):      Lack of Transportation (Non-Medical):    Physical Activity:      Days of Exercise per Week:      Minutes of Exercise per Session:    Stress:      Feeling of Stress :    Social Connections:      Frequency of Communication with Friends and Family:      Frequency of Social Gatherings with Friends and Family:      Attends Mosque Services:      Active Member of Clubs or Organizations:      Attends Club or Organization Meetings:      Marital Status:    Intimate  Partner Violence:      Fear of Current or Ex-Partner:      Emotionally Abused:      Physically Abused:      Sexually Abused:           Medications  Allergies   Scheduled Meds:  Continuous Infusions:    heparin 1,200 Units/hr (09/16/21 0405)     lactated ringers 100 mL/hr at 09/16/21 0618     PRN Meds:.acetaminophen **OR** acetaminophen, melatonin, nitroGLYcerin, ondansetron **OR** ondansetron Allergies   Allergen Reactions     No Known Allergies      Cetirizine Rash         Lab Results    Chemistry/lipid CBC Cardiac Enzymes/BNP/TSH/INR   Lab Results   Component Value Date    CHOL 159 04/10/2009    HDL 35 (L) 04/10/2009    TRIG 116 04/10/2009    BUN 10 09/15/2021     09/15/2021    CO2 24 09/15/2021    Lab Results   Component Value Date    WBC 12.3 (H) 09/16/2021    HGB 13.1 09/16/2021    HCT 38.4 09/16/2021    MCV 85 09/16/2021     09/16/2021    Lab Results   Component Value Date    TROPONINI 2.83 (HH) 09/16/2021    TSH 1.69 01/16/2017

## 2021-09-16 NOTE — PRE-PROCEDURE
GENERAL PRE-PROCEDURE:   Procedure:  Coronary angiogram with possible PCI  Date/Time:  9/16/2021 11:41 AM    Written consent obtained?: Yes    Risks and benefits: Risks, benefits and alternatives were discussed    Consent given by:  Patient  Patient states understanding of procedure being performed: Yes    Patient's understanding of procedure matches consent: Yes    Procedure consent matches procedure scheduled: Yes    Expected level of sedation:  Moderate  Appropriately NPO:  Yes  ASA Class:  4 (NSTEMI, Class II obesity; BMI 39.19kg/m2)  Mallampati  :  Grade 1- soft palate, uvula, tonsillar pillars, and posterior pharyngeal wall visible  Lungs:  Lungs clear with good breath sounds bilaterally  Heart:  Normal heart sounds and rate  History & Physical reviewed:  History and physical reviewed and no updates needed  Statement of review:  I have reviewed the lab findings, diagnostic data, medications, and the plan for sedation

## 2021-09-17 ENCOUNTER — APPOINTMENT (OUTPATIENT)
Dept: MRI IMAGING | Facility: HOSPITAL | Age: 41
DRG: 287 | End: 2021-09-17
Attending: INTERNAL MEDICINE
Payer: COMMERCIAL

## 2021-09-17 VITALS
TEMPERATURE: 97.6 F | HEIGHT: 67 IN | SYSTOLIC BLOOD PRESSURE: 142 MMHG | DIASTOLIC BLOOD PRESSURE: 65 MMHG | RESPIRATION RATE: 18 BRPM | HEART RATE: 72 BPM | WEIGHT: 249.1 LBS | OXYGEN SATURATION: 99 % | BODY MASS INDEX: 39.1 KG/M2

## 2021-09-17 PROCEDURE — 255N000002 HC RX 255 OP 636: Performed by: INTERNAL MEDICINE

## 2021-09-17 PROCEDURE — 99239 HOSP IP/OBS DSCHRG MGMT >30: CPT | Performed by: HOSPITALIST

## 2021-09-17 PROCEDURE — 250N000013 HC RX MED GY IP 250 OP 250 PS 637: Performed by: INTERNAL MEDICINE

## 2021-09-17 PROCEDURE — 999N000122 MR MYOCARDIUM  OVERREAD

## 2021-09-17 PROCEDURE — A9585 GADOBUTROL INJECTION: HCPCS | Performed by: INTERNAL MEDICINE

## 2021-09-17 PROCEDURE — 99232 SBSQ HOSP IP/OBS MODERATE 35: CPT | Performed by: INTERNAL MEDICINE

## 2021-09-17 PROCEDURE — 75561 CARDIAC MRI FOR MORPH W/DYE: CPT | Mod: 26 | Performed by: GENERAL ACUTE CARE HOSPITAL

## 2021-09-17 PROCEDURE — 75561 CARDIAC MRI FOR MORPH W/DYE: CPT

## 2021-09-17 RX ORDER — GADOBUTROL 604.72 MG/ML
18 INJECTION INTRAVENOUS ONCE
Status: COMPLETED | OUTPATIENT
Start: 2021-09-17 | End: 2021-09-17

## 2021-09-17 RX ADMIN — ASPIRIN 162 MG: 81 TABLET, COATED ORAL at 08:11

## 2021-09-17 RX ADMIN — GADOBUTROL 18 ML: 604.72 INJECTION INTRAVENOUS at 05:42

## 2021-09-17 RX ADMIN — MAGNESIUM OXIDE TAB 400 MG (241.3 MG ELEMENTAL MG) 400 MG: 400 (241.3 MG) TAB at 08:11

## 2021-09-17 ASSESSMENT — MIFFLIN-ST. JEOR: SCORE: 1832.54

## 2021-09-17 NOTE — DISCHARGE SUMMARY
[facility] MEDICINE  DISCHARGE SUMMARY     Primary Care Physician: Deborah Mayes  Admission Date: 9/15/2021   Discharge Provider: Nathaniel Garcia MD Discharge Date: 9/17/2021   Diet: None      Code Status: Full Code   Activity: bed rest        Condition at Discharge: Stable     REASON FOR PRESENTATION(See Admission Note for Details)   Chest pain    PRINCIPAL & ACTIVE DISCHARGE DIAGNOSES     Active Problems:    Chest pain    NSTEMI (non-ST elevated myocardial infarction) (H)      PENDING LABS     Unresulted Labs Ordered in the Past 30 Days of this Admission     No orders found from 8/16/2021 to 9/16/2021.            PROCEDURES ( this hospitalization only)          RECOMMENDATIONS TO OUTPATIENT PROVIDER FOR F/U VISIT     NA      DISPOSITION     Transfer [facility]    SUMMARY OF HOSPITAL COURSE:      Admitted with NSTEMI. See H&P transferred to [facility] for urgent cor angio  Reviewed with Dr Maldonado - hospitalist at [facility]    Discharge Medications with Med changes:     Discharge Medication List as of 9/16/2021 10:56 AM      CONTINUE these medications which have NOT CHANGED    Details   calcium carbonate-vitamin D (OSCAL W/D) 500-200 MG-UNIT tablet Take 2 tablets by mouth daily, Historical      cyclobenzaprine (FLEXERIL) 10 MG tablet Take 0.5-1 tablets (5-10 mg) by mouth 3 times daily as needed for muscle spasms, Disp-30 tablet, R-0, E-Prescribe      fluticasone (FLONASE) 50 MCG/ACT spray Spray 2 sprays into both nostrils daily, Disp-1 Bottle, R-11, No Print Out      folic acid (FOLVITE) 400 MCG tablet Take 400 mcg by mouth daily., Historical      magnesium oxide (MAG-OX) 400 MG tablet Take 400 mg by mouth daily, Historical      !! Multiple Vitamins-Minerals (HAIR SKIN AND NAILS FORMULA PO) Take 1 tablet by mouth daily, Historical      !! multivitamin w/minerals (MULTI-VITAMIN) tablet Take 1 tablet by mouth daily, Historical      MULTIVITAMIN TABS   OR 1 TABLET DAILY,  Historical       !! - Potential duplicate medications found. Please discuss with provider.                Rationale for medication changes:            Consults       PHARMACY IP CONSULT  PHARMACY IP CONSULT  PHARMACY IP CONSULT  PHARMACY IP CONSULT  CARDIOLOGY IP CONSULT    Immunizations given this encounter     Most Recent Immunizations   Administered Date(s) Administered     COVID-19,PF,Moderna 05/01/2021     TDAP Vaccine (Adacel) 03/28/2018           Anticoagulation Information    On low intensity heparin for NSTEMI      SIGNIFICANT IMAGING FINDINGS     Results for orders placed or performed during the hospital encounter of 09/15/21   XR Chest 2 Views    Impression    IMPRESSION: No evidence of active cardiopulmonary disease.        SIGNIFICANT LABORATORY FINDINGS         Discharge Orders     No discharge procedures on file.    Examination   Physical Exam   Temp:  [97.6  F (36.4  C)-98.7  F (37.1  C)] 97.6  F (36.4  C)  Pulse:  [68-89] 72  Resp:  [13-28] 18  BP: (101-152)/(51-82) 142/65  SpO2:  [96 %-99 %] 99 %  Wt Readings from Last 1 Encounters:   09/17/21 113 kg (249 lb 1.6 oz)           Please see EMR for more detailed significant labs, imaging, consultant notes etc.        Nathaniel Garcia MD  [facility]    CC:Deborah Mayes

## 2021-09-17 NOTE — DISCHARGE SUMMARY
.    Discharge Summary     Primary Care Physician: Deborah Mayes  Admission Date: 9/16/2021   Discharge Provider: Alicia Kurtz MD Discharge Date: 9/17/2021   Diet: regular diet   Code Status: Full Code   Activity: As toelrated        Condition at Discharge: Stable      REASON FOR PRESENTATION(See Admission Note for Details)     Chest pain    PRINCIPAL & ACTIVE DISCHARGE DIAGNOSES     Active Problems:    Status post coronary angiogram      SIGNIFICANT FINDINGS (Imaging, labs):     XR Chest 2 Views  Result Date: 9/16/2021      IMPRESSION: No evidence of active cardiopulmonary disease.     Cardiac Catheterization  Result Date: 9/16/2021    The ejection fraction is greater than 55% by visual estimate.  42 yo woman with back then chest pain, normal echo but abn ECG and troponin prompting cardiac catheterization Angiography via left radial LM normal LAD normal Circ normal RCA normal LVEDP 13mmHg Normal LV EF no WMA Imp/plan 1. Chest/back pain insetting of abn ECG/troponin worrisome for infarct but no severe epicardial disease found, possible embolus to small territory that has lysed vs focal myocarditis (not likely) vs SCAD that opened spontaneously.  Will arrange for cardiac MRI to screen for infarct or myocarditis.   Stop heparin given neg d-dimer and no severe CAD, but start asp 162mg daily.      Echocardiogram Complete  Result Date: 9/16/2021   ______________________________________________________________________________ Procedure Definity ([facility] #82775-281) given intravenously. [id number] exp [id number]. ______________________________________________________________________________ Interpretation Summary  1. Normal left ventricular size and systolic performance with a visually estimated ejection fraction of 65-70%. 2. No significant valvular heart disease is identified on this study. 3. Normal right ventricular size and systolic performance.  ______________________________________________________________________________ Left ventricle: Normal left ventricular size and systolic performance with a visually estimated ejection fraction of 65-70%. There is normal regional wall motion. Left ventricular wall thickness is normal.  Assessment of LV Diastolic Function: The cumulative findings suggest normal diastolic filling [The septal e' velocity is > 7 cm/s & lateral e' velocity is > 10 cm/s. The average E/e' is < 14. The TR velocity cannot be determined due to insufficient tricuspid insufficiency signal. Left atrial volume index is less than 34 mL/mÂ ].  Right ventricle: Normal right ventricular size and systolic performance.  Left atrium: There is mild left atrial enlargement.  Right atrium: The right atrium is of normal size.  IVC: The IVC is of normal caliber.  Aortic valve: The aortic valve is comprised of three cusps. No significant aortic stenosis or aortic insufficiency is detected on this study.  Mitral valve: The mitral valve appears morphologically normal. There is trace mitral insufficiency.  Tricuspid valve: The tricuspid valve is grossly morphologically normal. There is trace tricuspid insufficiency.  Pulmonic valve: The pulmonic valve is grossly morphologically normal.  Thoracic aorta: The aortic root and proximal ascending aorta are of normal dimension.  Pericardium: There is no significant pericardial effusion. ______________________________________________________________________________ ______________________________________________________________________________ MMode/2D Measurements & Calculations RVDd: 3.8 cm IVSd: 0.76 cm LVIDd: 4.8 cm LVIDs: 2.4 cm LVPWd: 1.0 cm FS: 50.7 % LV mass(C)d: 146.7 grams LV mass(C)dI: 66.0 grams/m2 Ao root diam: 2.8 cm LA dimension: 2.3 cm asc Aorta Diam: 2.8 cm LA/Ao: 0.83 LVOT diam: 2.0 cm LVOT area: 3.0 cm2  LA Volume Indexed (AL/bp): 10.0 ml/m2 RWT: 0.42  Doppler Measurements & Calculations MV E max lorrie:  85.4 cm/sec MV A max alessandro: 72.3 cm/sec MV E/A: 1.2 MV dec slope: 410.9 cm/sec2 MV dec time: 0.21 sec Ao V2 max: 163.9 cm/sec Ao max P.0 mmHg Ao V2 mean: 113.4 cm/sec Ao mean P.9 mmHg Ao V2 VTI: 35.1 cm ARPITA(I,D): 2.6 cm2 ARPITA(V,D): 2.6 cm2 LV V1 max P.0 mmHg LV V1 max: 141.2 cm/sec LV V1 VTI: 29.6 cm SV(LVOT): 89.6 ml SI(LVOT): 40.3 ml/m2 PA acc time: 0.12 sec AV Alessandro Ratio (DI): 0.86 ARPITA Index (cm2/m2): 1.1 E/E': 4.9 E/E' av.3 Lateral E/e': 4.9 Medial E/e': 9.8  Peak E' Alessandro: 17.6 cm/sec  ______________________________________________________________________________ Report approved by: Gricelda Zamarripa 2021 11:58 AM       MR Myocardium w Contrast      1.  Normal left ventricular size, wall thickness, and systolic function. The quantified left ventricular ejection fraction is 71%.   2.  Normal right ventricular size and systolic function.  The quantified right ventricular ejection fraction is 55%.   3.  No evidence of myocardial edema, focal or diffuse myocardial scarring or fibrosis, or infiltrative disease. 4.  No significant valvular abnormalities.  ========================================================================================================== REPORT FINDINGS: LEFT VENTRICLE: LV wall thickness is normal. LV cavity size is normal. LV systolic function is normal. There is no LV mass/thrombus. The native myocardial T1 value measures 945 ms. The calculated ECV is 28% using a hematocrit of 38%. Quantitative LVEF 71.4 %. VIABILITY: Hyperenhancement is normal. RIGHT VENTRICLE: RV wall thickness is normal. RV cavity size is normal. RV systolic function is normal. There is no RV mass/thrombus. Quantitative RVEF 55.3 %. LA/RA SEPTUM: The atrial septum appears normal. LEFT ATRIUM: LA cavity size is mildly enlarged. RIGHT ATRIUM: RA cavity size is normal. PERICARDIUM: Pericardium is normal. There is no pericardial effusion. PLEURAL EFFUSION: There is no pleural effusion. AORTIC VALVE:   Aortic valve is trileaflet. There is no aortic regurgitation. There is no aortic stenosis. MITRAL VALVE:  Mitral valve leaflets are normal. There is no mitral regurgitation. There is no mitral stenosis. TRICUSPID VALVE: Tricuspid valve leaflets are normal. There is no tricuspid regurgitation. There is no tricuspid stenosis. PULMONIC VALVE: Pulmonic valve leaflets are normal. There is no pulmonic regurgitation. There is no pulmonic stenosis. AORTIC ROOT: The aortic root is normal. CHEST:  Normal thoracic aortic size and its major branches. CORE EXAM  ==========================================================================    MR Myocardium  Overread    1.  Normal left ventricular size, wall thickness, and systolic function. The quantified left ventricular ejection fraction is 71%.   2.  Normal right ventricular size and systolic function.  The quantified right ventricular ejection fraction is 55%.   3.  No evidence of myocardial edema, focal or diffuse myocardial scarring or fibrosis, or infiltrative disease. 4.  No significant valvular abnormalities.  ========================================================================================================== REPORT FINDINGS: LEFT VENTRICLE: LV wall thickness is normal. LV cavity size is normal. LV systolic function is normal. There is no LV mass/thrombus. The native myocardial T1 value measures 945 ms. The calculated ECV is 28% using a hematocrit of 38%. Quantitative LVEF 71.4 %. VIABILITY: Hyperenhancement is normal. RIGHT VENTRICLE: RV wall thickness is normal. RV cavity size is normal. RV systolic function is normal. There is no RV mass/thrombus. Quantitative RVEF 55.3 %. LA/RA SEPTUM: The atrial septum appears normal. LEFT ATRIUM: LA cavity size is mildly enlarged. RIGHT ATRIUM: RA cavity size is normal. PERICARDIUM: Pericardium is normal. There is no pericardial effusion. PLEURAL EFFUSION: There is no pleural effusion. AORTIC VALVE:  Aortic valve is trileaflet.  There is no aortic regurgitation. There is no aortic stenosis. MITRAL VALVE:  Mitral valve leaflets are normal. There is no mitral regurgitation. There is no mitral stenosis. TRICUSPID VALVE: Tricuspid valve leaflets are normal. There is no tricuspid regurgitation. There is no tricuspid stenosis. PULMONIC VALVE: Pulmonic valve leaflets are normal. There is no pulmonic regurgitation. There is no pulmonic stenosis. AORTIC ROOT: The aortic root is normal. CHEST:  Normal thoracic aortic size and its major branches. CORE EXAM  ============================================================================    PENDING LABS     [unfilled]    PROCEDURES ( this hospitalization only)      Procedure(s):  Coronary Angiogram with Left Ventricular    RECOMMENDATIONS TO OUTPATIENT PROVIDER FOR F/U VISIT     Follow up with PCP in 1 week  Follow up with cardiology,  in 6 weeks    DISPOSITION     Home    SUMMARY OF HOSPITAL COURSE:      40 y/o female with history of obesity initially presented to  with chest pain.Troponins noted to be trending up hence pt was transferred  to [facility] for coronary angiogram  EKG showed some T wave inversions. Coronary angiogram was unremarkable and did not demonstrate any obstructive disease. Pt also underwent cardiac MRI without any evidence of wall motion abnormality with normal LV function. Pt stay otherwise uneventful. Discussed with  about discharge plans.     Patient stable to be discharged home today. Please refer to discharge medications and instructions for more details.      Discharge Medications with Med changes:     Current Discharge Medication List      START taking these medications    Details   aspirin (ASA) 81 MG EC tablet Take 2 tablets (162 mg) by mouth daily  Qty: 60 tablet, Refills: 0    Associated Diagnoses: Chest pain, unspecified type         CONTINUE these medications which have NOT CHANGED    Details   calcium carbonate-vitamin D (OSCAL W/D) 500-200 MG-UNIT  tablet Take 2 tablets by mouth daily      cyclobenzaprine (FLEXERIL) 10 MG tablet Take 0.5-1 tablets (5-10 mg) by mouth 3 times daily as needed for muscle spasms  Qty: 30 tablet, Refills: 0    Associated Diagnoses: Neck pain      fluticasone (FLONASE) 50 MCG/ACT spray Spray 2 sprays into both nostrils daily  Qty: 1 Bottle, Refills: 11    Associated Diagnoses: Dysosmia; Seasonal allergic rhinitis, unspecified chronicity, unspecified trigger      folic acid (FOLVITE) 400 MCG tablet Take 400 mcg by mouth daily.      magnesium oxide (MAG-OX) 400 MG tablet Take 400 mg by mouth daily      !! Multiple Vitamins-Minerals (HAIR SKIN AND NAILS FORMULA PO) Take 3 tablets by mouth daily       !! multivitamin w/minerals (MULTI-VITAMIN) tablet Take 1 tablet by mouth daily       !! - Potential duplicate medications found. Please discuss with provider.              Rationale for medication changes:              Consults   Cardiology      Immunizations given this encounter     [unfilled]       Anticoagulation Information      Recent INR results: No results for input(s): INR in the last 168 hours.  Warfarin doses (if applicable) or name of other anticoagulant: N/A      Discharge Orders     Discharge Procedure Orders   Reason for your hospital stay   Order Comments: Chest pain     Follow-up and recommended labs and tests    Order Comments: Follow up with primary care provider, Deborah Mayes, within 7 days for hospital follow- up.  No follow up labs or test are needed.     Activity   Order Comments: Your activity upon discharge: activity as tolerated     Order Specific Question Answer Comments   Is discharge order? Yes      Diet   Order Comments: Follow this diet upon discharge: Orders Placed This Encounter      Regular Diet Adult     Order Specific Question Answer Comments   Is discharge order? Yes      Examination     Vital Signs in last 24 hours:     General: Pleasant, NAD  HEENT:EOMI, AT,NC  CVS:RRR, no  edema  RS:CTAB  Abd: Soft, NT,ND  Neurology:Grossly normal  Psy:Approrpiate affect        Please see EMR for more detailed significant labs, imaging, consultant notes etc.  Total time spent on discharge: 35 minutes    Alicia Kurtz MD  [facility]: Ph:[phone]    CC:Deborah Mayes

## 2021-09-17 NOTE — PROGRESS NOTES
Cardiology Progress Note    Assessment/Plan:  1.  Elevated troponin, etiology remains unclear as coronary angiography yesterday demonstrated no evidence of epicardial disease.  She did undergo cardiac MRI late yesterday afternoon which also came back showing normal LV function without evidence of a wall motion abnormality, infiltrative disease or inflammation.  Nothing to suggest PE at the time of admission with normal oxygenation and D-dimer.  At this point, okay from a cardiac standpoint to discharge home.  I would recommend follow-up with Dr. Fernandez in 6 weeks.    Subjective:  Denies any further chest discomfort.      aspirin  162 mg Oral Daily     magnesium oxide  400 mg Oral Daily         Objective:   Vital signs in last 24 hours:  Temp:  [97.6  F (36.4  C)-98.7  F (37.1  C)] 97.6  F (36.4  C)  Pulse:  [68-89] 72  Resp:  [13-28] 18  BP: (101-152)/(51-82) 142/65  SpO2:  [96 %-99 %] 99 %  Weight:        Review of Systems:  Negative    Physical Exam:  General appearance: alert, cooperative, no distress   Head: Normocephalic, without obvious abnormality, atraumatic  Neck: no JVD   Lungs: clear to auscultation bilaterally   Heart: Regular rate and rhythm.  S1, S2 normal.  Extremities: Left radial site dry and intact.    Cardiographics (personally reviewed):   Telemetry demonstrates sinus rhythm    Imaging (personally reviewed):   Cardiac MRI demonstrates normal left ventricular function without wall motion abnormality.  No evidence of inflammation, infiltrative process or fibrosis.    Lab Results (personally reviewed):     Recent Labs   Lab Test 09/16/21  0839   CHOL 158   HDL 40*   LDL 90   TRIG 138     Recent Labs   Lab Test 09/16/21  0839   LDL 90     Recent Labs   Lab Test 09/15/21  2157      POTASSIUM 4.0   CHLORIDE 107   CO2 24   *   BUN 10   CR 0.86   GFRESTIMATED 85   ROSSANA 8.9     Recent Labs   Lab Test 09/15/21  2157 05/09/18  1538   CR 0.86 0.88     No results for input(s): A1C in the last  13566 hours.       Recent Labs   Lab Test 09/16/21  0440   WBC 12.3*   HGB 13.1   HCT 38.4   MCV 85        Recent Labs   Lab Test 09/16/21  0440 09/15/21  2157 01/31/19  0642   HGB 13.1 13.6 10.6*    Recent Labs   Lab Test 09/16/21  0440 09/16/21  0022 09/15/21  2157   TROPONINI 2.83* 0.77* 0.03     No results for input(s): BNP, NTBNPI, NTBNP in the last 70473 hours.  Recent Labs   Lab Test 01/16/17  1433   TSH 1.69     No results for input(s): INR in the last 97189 hours.       Ann Marie Yee MD

## 2021-09-17 NOTE — PLAN OF CARE
No post angio complications, site healing well. Cardiac MRI done this morning. VSS no reports of pain.   Problem: Embolism (Cardiac Catheterization)  Goal: Absence of Embolism Signs and Symptoms  Outcome: No Change     Problem: Vascular Access Protection (Cardiac Catheterization)  Goal: Absence of Vascular Access Complication  Outcome: No Change

## 2021-09-17 NOTE — PHARMACY-ADMISSION MEDICATION HISTORY
Pharmacy Note - Admission Medication History    Pertinent Provider Information: None     ______________________________________________________________________    Prior To Admission (PTA) med list completed and updated in EMR.       PTA Med List   Medication Sig Last Dose     calcium carbonate-vitamin D (OSCAL W/D) 500-200 MG-UNIT tablet Take 2 tablets by mouth daily More than a month at Unknown time     cyclobenzaprine (FLEXERIL) 10 MG tablet Take 0.5-1 tablets (5-10 mg) by mouth 3 times daily as needed for muscle spasms  at Has at home but has never taken     fluticasone (FLONASE) 50 MCG/ACT spray Spray 2 sprays into both nostrils daily (Patient taking differently: Spray 2 sprays into both nostrils daily as needed ) More than a month at Unknown time     folic acid (FOLVITE) 400 MCG tablet Take 400 mcg by mouth daily. More than a month at Unknown time     magnesium oxide (MAG-OX) 400 MG tablet Take 400 mg by mouth daily More than a month at Unknown time     Multiple Vitamins-Minerals (HAIR SKIN AND NAILS FORMULA PO) Take 3 tablets by mouth daily  More than a month at Unknown time     multivitamin w/minerals (MULTI-VITAMIN) tablet Take 1 tablet by mouth daily More than a month at Unknown time       Information source(s): Patient and CareEverywhere/SureScripts  Method of interview communication: in-person    Summary of Changes to PTA Med List  None    Patient was asked about OTC/herbal products specifically.  PTA med list reflects this.    Allergies were reviewed, assessed, and updated with the patient.      Patient does not use any multi-dose medications prior to admission.    The information provided in this note is only as accurate as the sources available at the time of the update(s).    Thank you for the opportunity to participate in the care of this patient.    Kathy Saleh, [facility]  9/16/2021 7:42 PM

## 2021-09-18 ENCOUNTER — HEALTH MAINTENANCE LETTER (OUTPATIENT)
Age: 41
End: 2021-09-18

## 2021-09-28 ENCOUNTER — OFFICE VISIT (OUTPATIENT)
Dept: FAMILY MEDICINE | Facility: CLINIC | Age: 41
End: 2021-09-28
Payer: COMMERCIAL

## 2021-09-28 VITALS
BODY MASS INDEX: 39.16 KG/M2 | DIASTOLIC BLOOD PRESSURE: 63 MMHG | SYSTOLIC BLOOD PRESSURE: 127 MMHG | OXYGEN SATURATION: 99 % | HEART RATE: 89 BPM | TEMPERATURE: 98.9 F | WEIGHT: 250 LBS

## 2021-09-28 DIAGNOSIS — R07.9 CHEST PAIN, UNSPECIFIED TYPE: Primary | ICD-10-CM

## 2021-09-28 PROBLEM — I21.4 NSTEMI (NON-ST ELEVATED MYOCARDIAL INFARCTION) (H): Status: RESOLVED | Noted: 2021-09-16 | Resolved: 2021-09-28

## 2021-09-28 PROCEDURE — 99214 OFFICE O/P EST MOD 30 MIN: CPT | Performed by: NURSE PRACTITIONER

## 2021-09-28 NOTE — PROGRESS NOTES
"    Assessment & Plan     Chest pain, unspecified type  Stable  She will follow up with cardiology.         32 minutes spent on the date of the encounter doing chart review, patient visit and documentation        BMI:   Estimated body mass index is 39.16 kg/m  as calculated from the following:    Height as of 9/16/21: 1.702 m (5' 7\").    Weight as of this encounter: 113.4 kg (250 lb).   Weight management plan: Discussed healthy diet and exercise guidelines        No follow-ups on file.    Deborah Mayes NP  [facility]    Mario Cruz is a 41 year old who presents for the following health issues     HPI       Hospital Follow-up Visit:    Hospital/Nursing Home/IP Rehab Facility: [facility]  Date of Admission: first admitted to [facility] then transferred to [place]   Date of Discharge: 9/15/2021  Reason(s) for Admission: 9/17/2021      Was your hospitalization related to COVID-19? No   Problems taking medications regularly:  None  Medication changes since discharge: Asprin 81 MG, continue with magnesium   Problems adhering to non-medication therapy:  None    Summary of hospitalization:  [facility] discharge summary reviewed  Primary Care Physician: Deborah Mayes                                                Admission Date: 9/16/2021   Discharge Provider: Alicia Kurtz MD Discharge Date: 9/17/2021   Diet: regular diet    Code Status: Full Code   Activity: As toelrated           Condition at Discharge: Stable      REASON FOR PRESENTATION(See Admission Note for Details)      Chest pain     PRINCIPAL & ACTIVE DISCHARGE DIAGNOSES      Active Problems:    Status post coronary angiogram        SIGNIFICANT FINDINGS (Imaging, labs):      XR Chest 2 Views  Result Date: 9/16/2021        IMPRESSION: No evidence of active cardiopulmonary disease.      Cardiac Catheterization  Result Date: 9/16/2021    The ejection fraction is greater than " 55% by visual estimate.  40 yo woman with back then chest pain, normal echo but abn ECG and troponin prompting cardiac catheterization Angiography via left radial LM normal LAD normal Circ normal RCA normal LVEDP 13mmHg Normal LV EF no WMA Imp/plan 1. Chest/back pain insetting of abn ECG/troponin worrisome for infarct but no severe epicardial disease found, possible embolus to small territory that has lysed vs focal myocarditis (not likely) vs SCAD that opened spontaneously.  Will arrange for cardiac MRI to screen for infarct or myocarditis.   Stop heparin given neg d-dimer and no severe CAD, but start asp 162mg daily.       Echocardiogram Complete  Result Date: 9/16/2021   ______________________________________________________________________________ Procedure Definity (NDC #83550-871) given intravenously. [id number] exp [id number]. ______________________________________________________________________________ Interpretation Summary  1. Normal left ventricular size and systolic performance with a visually estimated ejection fraction of 65-70%. 2. No significant valvular heart disease is identified on this study. 3. Normal right ventricular size and systolic performance. ______________________________________________________________________________ Left ventricle: Normal left ventricular size and systolic performance with a visually estimated ejection fraction of 65-70%. There is normal regional wall motion. Left ventricular wall thickness is normal.  Assessment of LV Diastolic Function: The cumulative findings suggest normal diastolic filling [The septal e' velocity is > 7 cm/s & lateral e' velocity is > 10 cm/s. The average E/e' is < 14. The TR velocity cannot be determined due to insufficient tricuspid insufficiency signal. Left atrial volume index is less than 34 mL/mÂ ].  Right ventricle: Normal right ventricular size and systolic performance.  Left atrium: There is mild left atrial enlargement.  Right atrium: The  right atrium is of normal size.  IVC: The IVC is of normal caliber.  Aortic valve: The aortic valve is comprised of three cusps. No significant aortic stenosis or aortic insufficiency is detected on this study.  Mitral valve: The mitral valve appears morphologically normal. There is trace mitral insufficiency.  Tricuspid valve: The tricuspid valve is grossly morphologically normal. There is trace tricuspid insufficiency.  Pulmonic valve: The pulmonic valve is grossly morphologically normal.  Thoracic aorta: The aortic root and proximal ascending aorta are of normal dimension.  Pericardium: There is no significant pericardial effusion. ______________________________________________________________________________ ______________________________________________________________________________ MMode/2D Measurements & Calculations RVDd: 3.8 cm IVSd: 0.76 cm LVIDd: 4.8 cm LVIDs: 2.4 cm LVPWd: 1.0 cm FS: 50.7 % LV mass(C)d: 146.7 grams LV mass(C)dI: 66.0 grams/m2 Ao root diam: 2.8 cm LA dimension: 2.3 cm asc Aorta Diam: 2.8 cm LA/Ao: 0.83 LVOT diam: 2.0 cm LVOT area: 3.0 cm2  LA Volume Indexed (AL/bp): 10.0 ml/m2 RWT: 0.42  Doppler Measurements & Calculations MV E max alessandro: 85.4 cm/sec MV A max alessandro: 72.3 cm/sec MV E/A: 1.2 MV dec slope: 410.9 cm/sec2 MV dec time: 0.21 sec Ao V2 max: 163.9 cm/sec Ao max P.0 mmHg Ao V2 mean: 113.4 cm/sec Ao mean P.9 mmHg Ao V2 VTI: 35.1 cm ARPITA(I,D): 2.6 cm2 ARPITA(V,D): 2.6 cm2 LV V1 max P.0 mmHg LV V1 max: 141.2 cm/sec LV V1 VTI: 29.6 cm SV(LVOT): 89.6 ml SI(LVOT): 40.3 ml/m2 PA acc time: 0.12 sec AV Alessandro Ratio (DI): 0.86 ARPITA Index (cm2/m2): 1.1 E/E': 4.9 E/E' av.3 Lateral E/e': 4.9 Medial E/e': 9.8  Peak E' Alessandro: 17.6 cm/sec  ______________________________________________________________________________ Report approved by: Gricelda Zamarripa 2021 11:58 AM        MR Myocardium w Contrast       1.  Normal left ventricular size, wall thickness, and systolic function. The  quantified left ventricular ejection fraction is 71%.   2.  Normal right ventricular size and systolic function.  The quantified right ventricular ejection fraction is 55%.   3.  No evidence of myocardial edema, focal or diffuse myocardial scarring or fibrosis, or infiltrative disease. 4.  No significant valvular abnormalities.  ========================================================================================================== REPORT FINDINGS: LEFT VENTRICLE: LV wall thickness is normal. LV cavity size is normal. LV systolic function is normal. There is no LV mass/thrombus. The native myocardial T1 value measures 945 ms. The calculated ECV is 28% using a hematocrit of 38%. Quantitative LVEF 71.4 %. VIABILITY: Hyperenhancement is normal. RIGHT VENTRICLE: RV wall thickness is normal. RV cavity size is normal. RV systolic function is normal. There is no RV mass/thrombus. Quantitative RVEF 55.3 %. LA/RA SEPTUM: The atrial septum appears normal. LEFT ATRIUM: LA cavity size is mildly enlarged. RIGHT ATRIUM: RA cavity size is normal. PERICARDIUM: Pericardium is normal. There is no pericardial effusion. PLEURAL EFFUSION: There is no pleural effusion. AORTIC VALVE:  Aortic valve is trileaflet. There is no aortic regurgitation. There is no aortic stenosis. MITRAL VALVE:  Mitral valve leaflets are normal. There is no mitral regurgitation. There is no mitral stenosis. TRICUSPID VALVE: Tricuspid valve leaflets are normal. There is no tricuspid regurgitation. There is no tricuspid stenosis. PULMONIC VALVE: Pulmonic valve leaflets are normal. There is no pulmonic regurgitation. There is no pulmonic stenosis. AORTIC ROOT: The aortic root is normal. CHEST:  Normal thoracic aortic size and its major branches. CORE EXAM  ==========================================================================     MR Myocardium  Overread    1.  Normal left ventricular size, wall thickness, and systolic function. The quantified left ventricular  ejection fraction is 71%.   2.  Normal right ventricular size and systolic function.  The quantified right ventricular ejection fraction is 55%.   3.  No evidence of myocardial edema, focal or diffuse myocardial scarring or fibrosis, or infiltrative disease. 4.  No significant valvular abnormalities.  ========================================================================================================== REPORT FINDINGS: LEFT VENTRICLE: LV wall thickness is normal. LV cavity size is normal. LV systolic function is normal. There is no LV mass/thrombus. The native myocardial T1 value measures 945 ms. The calculated ECV is 28% using a hematocrit of 38%. Quantitative LVEF 71.4 %. VIABILITY: Hyperenhancement is normal. RIGHT VENTRICLE: RV wall thickness is normal. RV cavity size is normal. RV systolic function is normal. There is no RV mass/thrombus. Quantitative RVEF 55.3 %. LA/RA SEPTUM: The atrial septum appears normal. LEFT ATRIUM: LA cavity size is mildly enlarged. RIGHT ATRIUM: RA cavity size is normal. PERICARDIUM: Pericardium is normal. There is no pericardial effusion. PLEURAL EFFUSION: There is no pleural effusion. AORTIC VALVE:  Aortic valve is trileaflet. There is no aortic regurgitation. There is no aortic stenosis. MITRAL VALVE:  Mitral valve leaflets are normal. There is no mitral regurgitation. There is no mitral stenosis. TRICUSPID VALVE: Tricuspid valve leaflets are normal. There is no tricuspid regurgitation. There is no tricuspid stenosis. PULMONIC VALVE: Pulmonic valve leaflets are normal. There is no pulmonic regurgitation. There is no pulmonic stenosis. AORTIC ROOT: The aortic root is normal. CHEST:  Normal thoracic aortic size and its major branches. CORE EXAM  ============================================================================     PENDING LABS      [unfilled]     PROCEDURES ( this hospitalization only)       Procedure(s):  Coronary Angiogram with Left  Ventricular     RECOMMENDATIONS TO OUTPATIENT PROVIDER FOR F/U VISIT      Follow up with PCP in 1 week  Follow up with cardiology,  in 6 weeks     DISPOSITION      Home     SUMMARY OF HOSPITAL COURSE:       42 y/o female with history of obesity initially presented to  with chest pain.Troponins noted to be trending up hence pt was transferred  to [facility] for coronary angiogram  EKG showed some T wave inversions. Coronary angiogram was unremarkable and did not demonstrate any obstructive disease. Pt also underwent cardiac MRI without any evidence of wall motion abnormality with normal LV function. Pt stay otherwise uneventful. Discussed with  about discharge plans.      Patient stable to be discharged home today. Please refer to discharge medications and instructions for more details.        Discharge Medications with Med changes:           Current Discharge Medication List            START taking these medications     Details   aspirin (ASA) 81 MG EC tablet Take 2 tablets (162 mg) by mouth daily  Qty: 60 tablet, Refills: 0     Associated Diagnoses: Chest pain, unspecified type                CONTINUE these medications which have NOT CHANGED     Details   calcium carbonate-vitamin D (OSCAL W/D) 500-200 MG-UNIT tablet Take 2 tablets by mouth daily       cyclobenzaprine (FLEXERIL) 10 MG tablet Take 0.5-1 tablets (5-10 mg) by mouth 3 times daily as needed for muscle spasms  Qty: 30 tablet, Refills: 0     Associated Diagnoses: Neck pain       fluticasone (FLONASE) 50 MCG/ACT spray Spray 2 sprays into both nostrils daily  Qty: 1 Bottle, Refills: 11     Associated Diagnoses: Dysosmia; Seasonal allergic rhinitis, unspecified chronicity, unspecified trigger       folic acid (FOLVITE) 400 MCG tablet Take 400 mcg by mouth daily.       magnesium oxide (MAG-OX) 400 MG tablet Take 400 mg by mouth daily       !! Multiple Vitamins-Minerals (HAIR SKIN AND NAILS FORMULA PO) Take 3 tablets by mouth daily        !!  multivitamin w/minerals (MULTI-VITAMIN) tablet Take 1 tablet by mouth daily        !! - Potential duplicate medications found. Please discuss with provider.                   Rationale for medication changes:                   Consults   Cardiology        Immunizations given this encounter      [unfilled]        Anticoagulation Information       Recent INR results: No results for input(s): INR in the last 168 hours.  Warfarin doses (if applicable) or name of other anticoagulant: N/A        Discharge Orders          Discharge Procedure Orders   Reason for your hospital stay   Order Comments: Chest pain          Follow-up and recommended labs and tests    Order Comments: Follow up with primary care provider, Deborah Mayes, within 7 days for hospital follow- up.  No follow up labs or test are needed.          Activity   Order Comments: Your activity upon discharge: activity as tolerated      Order Specific Question Answer Comments   Is discharge order? Yes            Diet   Order Comments: Follow this diet upon discharge: Orders Placed This Encounter      Regular Diet Adult      Order Specific Question Answer Comments   Is discharge order? Yes        Examination      Vital Signs in last 24 hours:      General: Pleasant, NAD  HEENT:EOMI, AT,NC  CVS:RRR, no edema  RS:CTAB  Abd: Soft, NT,ND  Neurology:Grossly normal  Psy:Approrpiate affect           Please see EMR for more detailed significant labs, imaging, consultant notes etc.  Total time spent on discharge: 35 minutes     Alicia Kurtz MD  [facility]: Ph:[phone]     CC:Deborah Mayes       Diagnostic Tests/Treatments reviewed.  Follow up needed: cardiology  Other Healthcare Providers Involved in Patient s Care:         Specialist appointment - cardiology  Update since discharge: stable.       Post Discharge Medication Reconciliation: discharge medications reconciled, continue medications without change.  Plan of care  communicated with patient                  Review of Systems         Objective    /63   Pulse 89   Temp 98.9  F (37.2  C) (Oral)   Wt 113.4 kg (250 lb)   LMP 09/01/2021   SpO2 99%   BMI 39.16 kg/m    Body mass index is 39.16 kg/m .  Physical Exam   GENERAL: healthy, alert and no distress  MS: no gross musculoskeletal defects noted, no edema  SKIN: no suspicious lesions or rashes  PSYCH: mentation appears normal, affect normal/bright

## 2021-11-09 ENCOUNTER — OFFICE VISIT (OUTPATIENT)
Dept: CARDIOLOGY | Facility: CLINIC | Age: 41
End: 2021-11-09
Payer: COMMERCIAL

## 2021-11-09 VITALS
WEIGHT: 257 LBS | HEART RATE: 88 BPM | SYSTOLIC BLOOD PRESSURE: 116 MMHG | RESPIRATION RATE: 16 BRPM | DIASTOLIC BLOOD PRESSURE: 78 MMHG | HEIGHT: 65 IN | BODY MASS INDEX: 42.82 KG/M2

## 2021-11-09 DIAGNOSIS — E66.01 MORBID OBESITY (H): ICD-10-CM

## 2021-11-09 DIAGNOSIS — R00.2 PALPITATIONS: Primary | ICD-10-CM

## 2021-11-09 DIAGNOSIS — R79.89 TROPONIN LEVEL ELEVATED: ICD-10-CM

## 2021-11-09 PROCEDURE — 99214 OFFICE O/P EST MOD 30 MIN: CPT | Performed by: INTERNAL MEDICINE

## 2021-11-09 ASSESSMENT — MIFFLIN-ST. JEOR: SCORE: 1836.62

## 2021-11-09 NOTE — PROGRESS NOTES
"  Thank you, Dr. Jerry ref. provider found, for asking the [facility] team to see Ms. Nancy Greene to evaluate       Assessment/Recommendations   Assessment/Plan:  1. Palpitations - obtain 24 hour monitor, if elevated consider beta blocker as below.   2. Chest pain with + troponin, no other evidence myocardial injury, possible microvascular spasm, noted LDL 90mg/dL, consider CT calcium score and if > 100 would start statin, encourage diet and exercise with interval  , consider carvedilol or nadalol if symptoms return, cont aspirin. Repeat labs including markers and lipids. If Mg normal can consider stopping and consider stopping asp and restart fish oil pending results of lipids    On review with partner, source of pain/trop could be from spasm but also possible small embolus (either afib or paradoxical embolus) - will await 24 hour monitor and if no afib, would do 30 day monitor to screen for afib and if negative, would do a bubble study to screen for PFO.      Follow up 3-6 mo     History of Present Illness/Subjective    Ms. Nancy Greene is a 41 year old female with episode of chest pain/\"contraction on her chest\" - prompted visit to ED, could not take deep breath, radiated to jaw and shoulder and then wrist (but not down her arm), lasting 2 hours, starting 8:20 and resolved at 10:20 at night in ED, normal d-dimer, normal coronary angiography, cardaic MRI no infarct or ischemia, troponin was elevated upto ~2.   Went home and had chicken pot pie and liquid hydration stick and with one drink had same pain as when she came to ED, resolved.  She has stopped liquid hydration stick and chicken pie she stopped eating.  She had gall bladder problem with chicken pot pie in the past.    Prior to episode in Sept she just finished bathing her son, after developed back pain radiated to her chest...  She has has no further episodes since episode with hydration stick again in Sept.  No " "exertional chest pain/pressure, she has palpitations with HR upto 100 randomly, every day.  Once since discharge had one episode HR > 120, no dizzines or lightheaded.  She is trying to increase exercise tolerance with no restriction from breathing or chest pain/pressure standpoint.  No GI/ bleeding, normal menstruation, had IUD removed to remove estrogen.  MRI cardiac normal LV/falves and aorta.  She has had more headaches since discharge, not worse, normal MRI brain 2018 with mucosal thickening.   She may have fall allergies.         Physical Examination Review of Systems   /78 (BP Location: Left arm, Patient Position: Sitting, Cuff Size: Adult Large)   Pulse 88   Resp 16   Ht 1.651 m (5' 5\")   Wt 116.6 kg (257 lb)   BMI 42.77 kg/m    Body mass index is 42.77 kg/m .  Wt Readings from Last 3 Encounters:   11/09/21 116.6 kg (257 lb)   09/28/21 113.4 kg (250 lb)   09/17/21 113 kg (249 lb 1.6 oz)     [unfilled]  General Appearance:   no distress, normal body habitus   ENT/Mouth: membranes moist, no oral lesions or bleeding gums.      EYES:  no scleral icterus, normal conjunctivae   Neck: no carotid bruits or thyromegaly   Chest/Lungs:   lungs are clear to auscultation, no rales or wheezing,  sternal scar, equal chest wall expansion    Cardiovascular:   Regular. Normal first and second heart sounds with no murmurs, rubs, or gallops; the carotid, radial and posterior tibial pulses are intact, Jugular venous pressure , edema bilaterally    Abdomen:  no organomegaly, masses, bruits, or tenderness; bowel sounds are present   Extremities: no cyanosis or clubbing   Skin: no xanthelasma, warm.    Neurologic: normal  bilateral, no tremors     Psychiatric: alert and oriented x3, calm     Review of Systems - 12 points nega other than above      Medical History  Surgical History Family History Social History   Past Medical History:   Diagnosis Date     Headache      Headache      Headache(784.0)      NSTEMI (non-ST " elevated myocardial infarction) (H) 9/16/2021    Past Surgical History:   Procedure Laterality Date     BIOPSY       CHOLECYSTECTOMY, LAPOROSCOPIC  12/09    Cholecystectomy, Laparoscopic     CV CORONARY ANGIOGRAM WITH LV GRAM N/A 9/16/2021    Procedure: Coronary Angiogram with Left Ventricular;  Surgeon: Margarito Perez MD;  Location: [facility] CATH LAB CV     HC REMOVAL OF TONSILS,<13 Y/O       ZZC NONSPECIFIC PROCEDURE  4/03    broken nose    Family History   Problem Relation Age of Onset     Gastrointestinal Disease Mother         ulcers     Neurologic Disorder Mother         migraines     Hyperlipidemia Father      Cardiovascular Paternal Grandfather         2 MI     Lipids Paternal Grandfather      Hypertension Paternal Grandfather      Circulatory Paternal Grandfather         blood clots in legs     Prostate Cancer Paternal Grandfather      Eye Disorder Paternal Grandfather         glaucoma     Diabetes Paternal Grandfather      Hyperlipidemia Paternal Grandfather      Other - See Comments Paternal Grandfather         enlarged heart and blood clotting issues. History of heart attacks     Lipids Maternal Grandmother      Hypertension Maternal Grandmother      Neurologic Disorder Maternal Grandmother         migraines/brain aneurysm     Respiratory Sister         asthma     Eye Disorder Paternal Grandmother         glaucoma     Breast Cancer Paternal Grandmother     Social History     Socioeconomic History     Marital status:      Spouse name: Not on file     Number of children: Not on file     Years of education: Not on file     Highest education level: Not on file   Occupational History     Not on file   Tobacco Use     Smoking status: Never Smoker     Smokeless tobacco: Never Used   Vaping Use     Vaping Use: Never used   Substance and Sexual Activity     Alcohol use: Yes     Comment: 2/mo     Drug use: No     Sexual activity: Yes     Partners: Male     Birth control/protection: None      Comment: Depo provera-   Other Topics Concern      Service No     Blood Transfusions No     Caffeine Concern No     Occupational Exposure No     Hobby Hazards No     Sleep Concern No     Stress Concern No     Weight Concern Yes     Comment: trying to lose alter diet not going well     Special Diet No     Back Care No     Exercise Yes     Bike Helmet Yes     Seat Belt Yes     Self-Exams Yes     Parent/sibling w/ CABG, MI or angioplasty before 65F 55M? No   Social History Narrative    Dairy/d 1 servings/d. plus a calcium supplement    Caffeine 0 servings/d    Exercise 3 x week walks     Sunscreen used - Yes    Seatbelts used - Yes    Working smoke/CO detectors in the home - Yes    Guns stored in the home - No    Self Breast Exams - Yes    Self Testicular Exam - NA    Eye Exam up to date - yes    Dental Exam up to date - Yes    Pap Smear up to date -no    Mammogram up to date - NA    PSA up to date - NA    Dexa Scan up to date - NA    Flex Sig / Colonoscopy up to date - NA    Immunizations up to date - Yes-Td 2008    Abuse: Current or Past(Physical, Sexual or Emotional)- No    Do you feel safe in your environment - Yes                 Social Determinants of Health     Financial Resource Strain: Not on file   Food Insecurity: Not on file   Transportation Needs: Not on file   Physical Activity: Not on file   Stress: Not on file   Social Connections: Not on file   Intimate Partner Violence: Not on file   Housing Stability: Not on file          Medications  Allergies   Scheduled Meds:  Continuous Infusions:  PRN Meds:. Allergies   Allergen Reactions     Cetirizine Rash         Lab Results    Chemistry/lipid CBC Cardiac Enzymes/BNP/TSH/INR   Lab Results   Component Value Date    CHOL 158 09/16/2021    HDL 40 (L) 09/16/2021    TRIG 138 09/16/2021    BUN 10 09/15/2021     09/15/2021    CO2 24 09/15/2021    Lab Results   Component Value Date    WBC 12.3 (H) 09/16/2021    HGB 13.1 09/16/2021    HCT 38.4  09/16/2021    MCV 85 09/16/2021     09/16/2021    Lab Results   Component Value Date    TROPONINI 2.83 () 09/16/2021    TSH 1.69 01/16/2017              Margarito Perez MD  Interventional Cardiology  [facility]

## 2021-11-09 NOTE — LETTER
"11/9/2021    Deborah Mayes, NP  [address] [place]  [facility] [address]    RE: Nancy Greene       Dear Colleague,    I had the pleasure of seeing Nancy Greene in the [facility].    Thank you for asking the [facility] team to see Ms. Nancy Greene to evaluate     Assessment/Recommendations   Assessment/Plan:  1. Palpitations - obtain 24 hour monitor, if elevated consider beta blocker as below.   2. Chest pain with + troponin, no other evidence myocardial injury, possible microvascular spasm, noted LDL 90mg/dL, consider CT calcium score and if > 100 would start statin, encourage diet and exercise with interval  , consider carvedilol or nadalol if symptoms return, cont aspirin. Repeat labs including markers and lipids. If Mg normal can consider stopping and consider stopping asp and restart fish oil pending results of lipids    On review with partner, source of pain/trop could be from spasm but also possible small embolus (either afib or paradoxical embolus) - will await 24 hour monitor and if no afib, would do 30 day monitor to screen for afib and if negative, would do a bubble study to screen for PFO.      Follow up 3-6 mo     History of Present Illness/Subjective    Ms. Nancy Greene is a 41 year old female with episode of chest pain/\"contraction on her chest\" - prompted visit to ED, could not take deep breath, radiated to jaw and shoulder and then wrist (but not down her arm), lasting 2 hours, starting 8:20 and resolved at 10:20 at night in ED, normal d-dimer, normal coronary angiography, cardaic MRI no infarct or ischemia, troponin was elevated upto ~2.   Went home and had chicken pot pie and liquid hydration stick and with one drink had same pain as when she came to ED, resolved.  She has stopped liquid hydration stick and chicken pie she stopped eating.  She had gall bladder problem with " "chicken pot pie in the past.    Prior to episode in Sept she just finished bathing her son, after developed back pain radiated to her chest...  She has has no further episodes since episode with hydration stick again in Sept.  No exertional chest pain/pressure, she has palpitations with HR upto 100 randomly, every day.  Once since discharge had one episode HR > 120, no dizzines or lightheaded.  She is trying to increase exercise tolerance with no restriction from breathing or chest pain/pressure standpoint.  No GI/ bleeding, normal menstruation, had IUD removed to remove estrogen.  MRI cardiac normal LV/falves and aorta.  She has had more headaches since discharge, not worse, normal MRI brain 2018 with mucosal thickening.   She may have fall allergies.         Physical Examination Review of Systems   /78 (BP Location: Left arm, Patient Position: Sitting, Cuff Size: Adult Large)   Pulse 88   Resp 16   Ht 1.651 m (5' 5\")   Wt 116.6 kg (257 lb)   BMI 42.77 kg/m    Body mass index is 42.77 kg/m .  Wt Readings from Last 3 Encounters:   11/09/21 116.6 kg (257 lb)   09/28/21 113.4 kg (250 lb)   09/17/21 113 kg (249 lb 1.6 oz)     [unfilled]  General Appearance:   no distress, normal body habitus   ENT/Mouth: membranes moist, no oral lesions or bleeding gums.      EYES:  no scleral icterus, normal conjunctivae   Neck: no carotid bruits or thyromegaly   Chest/Lungs:   lungs are clear to auscultation, no rales or wheezing,  sternal scar, equal chest wall expansion    Cardiovascular:   Regular. Normal first and second heart sounds with no murmurs, rubs, or gallops; the carotid, radial and posterior tibial pulses are intact, Jugular venous pressure , edema bilaterally    Abdomen:  no organomegaly, masses, bruits, or tenderness; bowel sounds are present   Extremities: no cyanosis or clubbing   Skin: no xanthelasma, warm.    Neurologic: normal  bilateral, no tremors     Psychiatric: alert and oriented x3, calm     " Review of Systems - 12 points nega other than above      Medical History  Surgical History Family History Social History   Past Medical History:   Diagnosis Date     Headache      Headache      Headache(784.0)      NSTEMI (non-ST elevated myocardial infarction) (H) 9/16/2021    Past Surgical History:   Procedure Laterality Date     BIOPSY       CHOLECYSTECTOMY, LAPOROSCOPIC  12/09    Cholecystectomy, Laparoscopic     CV CORONARY ANGIOGRAM WITH LV GRAM N/A 9/16/2021    Procedure: Coronary Angiogram with Left Ventricular;  Surgeon: Margarito Perez MD;  Location: [facility] CATH LAB CV     HC REMOVAL OF TONSILS,<13 Y/O       ZZC NONSPECIFIC PROCEDURE  4/03    broken nose    Family History   Problem Relation Age of Onset     Gastrointestinal Disease Mother         ulcers     Neurologic Disorder Mother         migraines     Hyperlipidemia Father      Cardiovascular Paternal Grandfather         2 MI     Lipids Paternal Grandfather      Hypertension Paternal Grandfather      Circulatory Paternal Grandfather         blood clots in legs     Prostate Cancer Paternal Grandfather      Eye Disorder Paternal Grandfather         glaucoma     Diabetes Paternal Grandfather      Hyperlipidemia Paternal Grandfather      Other - See Comments Paternal Grandfather         enlarged heart and blood clotting issues. History of heart attacks     Lipids Maternal Grandmother      Hypertension Maternal Grandmother      Neurologic Disorder Maternal Grandmother         migraines/brain aneurysm     Respiratory Sister         asthma     Eye Disorder Paternal Grandmother         glaucoma     Breast Cancer Paternal Grandmother     Social History     Socioeconomic History     Marital status:      Spouse name: Not on file     Number of children: Not on file     Years of education: Not on file     Highest education level: Not on file   Occupational History     Not on file   Tobacco Use     Smoking status: Never Smoker     Smokeless tobacco:  Never Used   Vaping Use     Vaping Use: Never used   Substance and Sexual Activity     Alcohol use: Yes     Comment: 2/mo     Drug use: No     Sexual activity: Yes     Partners: Male     Birth control/protection: None     Comment: Depo provera-   Other Topics Concern      Service No     Blood Transfusions No     Caffeine Concern No     Occupational Exposure No     Hobby Hazards No     Sleep Concern No     Stress Concern No     Weight Concern Yes     Comment: trying to lose alter diet not going well     Special Diet No     Back Care No     Exercise Yes     Bike Helmet Yes     Seat Belt Yes     Self-Exams Yes     Parent/sibling w/ CABG, MI or angioplasty before 65F 55M? No   Social History Narrative    Dairy/d 1 servings/d. plus a calcium supplement    Caffeine 0 servings/d    Exercise 3 x week walks     Sunscreen used - Yes    Seatbelts used - Yes    Working smoke/CO detectors in the home - Yes    Guns stored in the home - No    Self Breast Exams - Yes    Self Testicular Exam - NA    Eye Exam up to date - yes    Dental Exam up to date - Yes    Pap Smear up to date -no    Mammogram up to date - NA    PSA up to date - NA    Dexa Scan up to date - NA    Flex Sig / Colonoscopy up to date - NA    Immunizations up to date - Yes-Td 2008    Abuse: Current or Past(Physical, Sexual or Emotional)- No    Do you feel safe in your environment - Yes                 Social Determinants of Health     Financial Resource Strain: Not on file   Food Insecurity: Not on file   Transportation Needs: Not on file   Physical Activity: Not on file   Stress: Not on file   Social Connections: Not on file   Intimate Partner Violence: Not on file   Housing Stability: Not on file          Medications  Allergies   Scheduled Meds:  Continuous Infusions:  PRN Meds:. Allergies   Allergen Reactions     Cetirizine Rash         Lab Results    Chemistry/lipid CBC Cardiac Enzymes/BNP/TSH/INR   Lab Results   Component Value Date    CHOL 158  09/16/2021    HDL 40 (L) 09/16/2021    TRIG 138 09/16/2021    BUN 10 09/15/2021     09/15/2021    CO2 24 09/15/2021    Lab Results   Component Value Date    WBC 12.3 (H) 09/16/2021    HGB 13.1 09/16/2021    HCT 38.4 09/16/2021    MCV 85 09/16/2021     09/16/2021    Lab Results   Component Value Date    TROPONINI 2.83 (HH) 09/16/2021    TSH 1.69 01/16/2017              Margarito Perez MD  Interventional Cardiology  [facility]

## 2021-11-11 ENCOUNTER — HOSPITAL ENCOUNTER (OUTPATIENT)
Dept: CT IMAGING | Facility: CLINIC | Age: 41
Discharge: HOME | End: 2021-11-11
Attending: INTERNAL MEDICINE | Admitting: INTERNAL MEDICINE

## 2021-11-11 DIAGNOSIS — R79.89 TROPONIN LEVEL ELEVATED: ICD-10-CM

## 2021-11-11 LAB
CV CALCIUM SCORE AGATSTON LM: 0
CV CALCIUM SCORING AGATSON LAD: 0
CV CALCIUM SCORING AGATSTON CX: 0
CV CALCIUM SCORING AGATSTON RCA: 0
CV CALCIUM SCORING AGATSTON TOTAL: 0

## 2021-11-11 PROCEDURE — 75571 CT HRT W/O DYE W/CA TEST: CPT

## 2021-11-11 PROCEDURE — 75571 CT HRT W/O DYE W/CA TEST: CPT | Mod: 26 | Performed by: INTERNAL MEDICINE

## 2021-11-12 ENCOUNTER — TELEPHONE (OUTPATIENT)
Dept: CARDIOLOGY | Facility: CLINIC | Age: 41
End: 2021-11-12
Payer: COMMERCIAL

## 2021-11-12 DIAGNOSIS — R00.2 PALPITATIONS: ICD-10-CM

## 2021-11-12 DIAGNOSIS — R00.2 PALPITATIONS: Primary | ICD-10-CM

## 2021-11-12 DIAGNOSIS — R07.9 CHEST PAIN: Primary | ICD-10-CM

## 2021-11-12 DIAGNOSIS — Z13.6 CARDIOVASCULAR SCREENING; LDL GOAL LESS THAN 160: ICD-10-CM

## 2021-11-12 NOTE — TELEPHONE ENCOUNTER
----- Message from Margarito Perez MD sent at 11/12/2021  9:14 AM CST -----  Wth the zero score, lipids - would not favor atheroscerlosis for source of her chest pain and abn troponin.  I spoke with a partner and agreed important to rule out a clot that went to the heart vessel - would do a 30 day event monitor and a bubble study to screen for PFO.  I know the MRI was negative but given the small troponin leak, it may not capture the event - I can't deny she had a very small heart attack given symptoms and the abn troponin - and don't want a repeat event or a stroke.   Thanks  Margarito

## 2021-11-16 ENCOUNTER — LAB (OUTPATIENT)
Dept: LAB | Facility: CLINIC | Age: 41
End: 2021-11-16
Attending: INTERNAL MEDICINE
Payer: COMMERCIAL

## 2021-11-16 ENCOUNTER — HOSPITAL ENCOUNTER (OUTPATIENT)
Dept: CARDIOLOGY | Facility: CLINIC | Age: 41
End: 2021-11-16
Attending: INTERNAL MEDICINE
Payer: COMMERCIAL

## 2021-11-16 DIAGNOSIS — R00.2 PALPITATIONS: ICD-10-CM

## 2021-11-16 DIAGNOSIS — R79.89 TROPONIN LEVEL ELEVATED: ICD-10-CM

## 2021-11-16 LAB
ALBUMIN SERPL-MCNC: 3.7 G/DL (ref 3.5–5)
ALP SERPL-CCNC: 48 U/L (ref 45–120)
ALT SERPL W P-5'-P-CCNC: 15 U/L (ref 0–45)
ANION GAP SERPL CALCULATED.3IONS-SCNC: 11 MMOL/L (ref 5–18)
AST SERPL W P-5'-P-CCNC: 12 U/L (ref 0–40)
BILIRUB SERPL-MCNC: 0.8 MG/DL (ref 0–1)
BUN SERPL-MCNC: 10 MG/DL (ref 8–22)
CALCIUM SERPL-MCNC: 8.4 MG/DL (ref 8.5–10.5)
CHLORIDE BLD-SCNC: 106 MMOL/L (ref 98–107)
CHOLEST SERPL-MCNC: 160 MG/DL
CO2 SERPL-SCNC: 21 MMOL/L (ref 22–31)
CREAT SERPL-MCNC: 0.79 MG/DL (ref 0.6–1.1)
CRP SERPL HS-MCNC: 7.5 MG/L (ref 0–3)
ERYTHROCYTE [DISTWIDTH] IN BLOOD BY AUTOMATED COUNT: 13 % (ref 10–15)
ERYTHROCYTE [SEDIMENTATION RATE] IN BLOOD BY WESTERGREN METHOD: 13 MM/HR (ref 0–20)
FASTING STATUS PATIENT QL REPORTED: YES
GFR SERPL CREATININE-BSD FRML MDRD: >90 ML/MIN/1.73M2
GLUCOSE BLD-MCNC: 98 MG/DL (ref 70–125)
HCT VFR BLD AUTO: 39.6 % (ref 35–47)
HDLC SERPL-MCNC: 42 MG/DL
HGB BLD-MCNC: 14 G/DL (ref 11.7–15.7)
LDLC SERPL CALC-MCNC: 91 MG/DL
MAGNESIUM SERPL-MCNC: 2.1 MG/DL (ref 1.8–2.6)
MCH RBC QN AUTO: 29.8 PG (ref 26.5–33)
MCHC RBC AUTO-ENTMCNC: 35.4 G/DL (ref 31.5–36.5)
MCV RBC AUTO: 84 FL (ref 78–100)
PLATELET # BLD AUTO: 276 10E3/UL (ref 150–450)
POTASSIUM BLD-SCNC: 4.1 MMOL/L (ref 3.5–5)
PROT SERPL-MCNC: 7.1 G/DL (ref 6–8)
RBC # BLD AUTO: 4.7 10E6/UL (ref 3.8–5.2)
SODIUM SERPL-SCNC: 138 MMOL/L (ref 136–145)
TRIGL SERPL-MCNC: 136 MG/DL
WBC # BLD AUTO: 8.3 10E3/UL (ref 4–11)

## 2021-11-16 PROCEDURE — 85014 HEMATOCRIT: CPT

## 2021-11-16 PROCEDURE — 93270 REMOTE 30 DAY ECG REV/REPORT: CPT

## 2021-11-16 PROCEDURE — 85652 RBC SED RATE AUTOMATED: CPT

## 2021-11-16 PROCEDURE — 82040 ASSAY OF SERUM ALBUMIN: CPT

## 2021-11-16 PROCEDURE — 83735 ASSAY OF MAGNESIUM: CPT

## 2021-11-16 PROCEDURE — 86141 C-REACTIVE PROTEIN HS: CPT

## 2021-11-16 PROCEDURE — 36415 COLL VENOUS BLD VENIPUNCTURE: CPT

## 2021-11-16 PROCEDURE — 82465 ASSAY BLD/SERUM CHOLESTEROL: CPT

## 2021-11-17 DIAGNOSIS — R07.9 CHEST PAIN: ICD-10-CM

## 2021-11-17 DIAGNOSIS — R00.2 PALPITATIONS: Primary | ICD-10-CM

## 2021-11-17 DIAGNOSIS — R79.89 TROPONIN LEVEL ELEVATED: ICD-10-CM

## 2021-11-26 NOTE — MR AVS SNAPSHOT
After Visit Summary   3/28/2018    Nancy Greene    MRN: [id number]           Patient Information     Date Of Birth          1980        Visit Information        Provider Department      3/28/2018 8:30 AM Deborah Mayes NP [facility]        Today's Diagnoses     Dysosmia    -  1    Primary exertional headache        Seasonal allergic rhinitis, unspecified chronicity, unspecified trigger          Care Instructions    Schedule the brain MRI.  Start Flonase daily.  If no improvement in a couple of weeks, let me know and I will do a referral to an ENT.           Follow-ups after your visit        Future tests that were ordered for you today     Open Future Orders        Priority Expected Expires Ordered    MR Brain w Contrast Routine  3/28/2019 3/28/2018            Who to contact     If you have questions or need follow up information about today's clinic visit or your schedule please contact [facility] directly at [phone].  Normal or non-critical lab and imaging results will be communicated to you by Interwisehart, letter or phone within 4 business days after the clinic has received the results. If you do not hear from us within 7 days, please contact the clinic through Interwisehart or phone. If you have a critical or abnormal lab result, we will notify you by phone as soon as possible.  Submit refill requests through Eatwave or call your pharmacy and they will forward the refill request to us. Please allow 3 business days for your refill to be completed.          Additional Information About Your Visit        Interwisehart Information     Eatwave gives you secure access to your electronic health record. If you see a primary care provider, you can also send messages to your care team and make appointments. If you have questions, please call your primary care clinic.  If you do not have a primary care provider, please call [phone] and they will assist  ----- Message from Elba Julian NP sent at 11/26/2021  8:21 AM CST -----  Congratulations, Hemoglobin A1c which is her average blood sugars over 3 months is back to normal.  Insulin level was at 2.  Kidney function is good.  Liver function is good.  However cholesterol is worse, had been 233, now 279.  LDLs are also worse, had been 140, now 170.  Doing the American Heart Association angie which includes looking at her blood pressure, cholesterol, whether she is a smoker diabetic puts her at risk of having a heart attack or a stroke at less than 2% over the next 10 years.  No need for cholesterol medication at this time, however encouraged her to follow the Mediterranean diet.  Please mail her the information on Mediterranean diet, and a copy of her lab results.  Limit red meats, deep fried foods.        The 10-year ASCVD risk score (Gwendolyn WHITLOCK Jr., et al., 2013) is: 1.6%    Values used to calculate the score:      Age: 57 years      Sex: Female      Is Non- : No      Diabetic: No      Tobacco smoker: No      Systolic Blood Pressure: 118 mmHg      Is BP treated: No      HDL Cholesterol: 98 mg/dL      Total Cholesterol: 279 mg/dL     "you.        Care EveryWhere ID     This is your Care EveryWhere ID. This could be used by other organizations to access your [place] medical records  [id number]        Your Vitals Were     Pulse Temperature Respirations Height Last Period Pulse Oximetry    74 97.2  F (36.2  C) (Oral) 16 5' 5\" (1.651 m) 03/15/2018 (Approximate) 97%    Breastfeeding? BMI (Body Mass Index)                No 39.81 kg/m2           Blood Pressure from Last 3 Encounters:   03/28/18 121/80   04/26/17 120/82   01/16/17 121/78    Weight from Last 3 Encounters:   03/28/18 239 lb 4 oz (108.5 kg)   04/26/17 225 lb (102.1 kg)   01/16/17 240 lb (108.9 kg)                 Today's Medication Changes          These changes are accurate as of 3/28/18  9:18 AM.  If you have any questions, ask your nurse or doctor.               Start taking these medicines.        Dose/Directions    fluticasone 50 MCG/ACT spray   Commonly known as:  FLONASE   Used for:  Dysosmia, Seasonal allergic rhinitis, unspecified chronicity, unspecified trigger   Started by:  Deborah Mayes NP        Dose:  2 spray   Spray 2 sprays into both nostrils daily   Quantity:  1 Bottle   Refills:  11            Where to get your medicines      Some of these will need a paper prescription and others can be bought over the counter.  Ask your nurse if you have questions.     You don't need a prescription for these medications     fluticasone 50 MCG/ACT spray                Primary Care Provider Office Phone # Fax #    Deborah Mayes [phone]       [address] [facility] [address]        Equal Access to Services     ADRY GAR AH: Hadfaye Call, waaxda luqnaomi, qaybta kaalabdi copeland. So [facility] [phone].    ATENCIÓN: Si habla español, tiene a sarmiento disposición servicios gratuitos de asistencia lingüística. Llame al [phone].    We comply with applicable federal civil rights laws and " [place] laws. We do not discriminate on the basis of race, color, national origin, age, disability, sex, sexual orientation, or gender identity.            Thank you!     Thank you for choosing [facility]  for your care. Our goal is always to provide you with excellent care. Hearing back from our patients is one way we can continue to improve our services. Please take a few minutes to complete the written survey that you may receive in the mail after your visit with us. Thank you!             Your Updated Medication List - Protect others around you: Learn how to safely use, store and throw away your medicines at www.disposemymeds.org.          This list is accurate as of 3/28/18  9:18 AM.  Always use your most recent med list.                   Brand Name Dispense Instructions for use Diagnosis    biotin 1000 MCG Tabs tablet      Take 1,000 mcg by mouth daily.        calcium carbonate 1250 MG tablet    OS-ROSSANA 500 mg [place]. Ca     one daily        fish oil-omega-3 fatty acids 1000 MG capsule      Take 2,000 mg by mouth daily.        fluticasone 50 MCG/ACT spray    FLONASE    1 Bottle    Spray 2 sprays into both nostrils daily    Dysosmia, Seasonal allergic rhinitis, unspecified chronicity, unspecified trigger       folic acid 400 MCG tablet    FOLVITE     Take 400 mcg by mouth daily.        MAGNESIUM PO           MULTIVITAMIN TABS   OR      1 TABLET DAILY        PROGESTERONE PO      Take by mouth daily 1 tablet daily    Hair loss

## 2021-12-07 ENCOUNTER — LAB (OUTPATIENT)
Dept: LAB | Facility: CLINIC | Age: 41
End: 2021-12-07
Attending: INTERNAL MEDICINE
Payer: COMMERCIAL

## 2021-12-07 ENCOUNTER — HOSPITAL ENCOUNTER (OUTPATIENT)
Dept: CARDIOLOGY | Facility: CLINIC | Age: 41
End: 2021-12-07
Attending: INTERNAL MEDICINE
Payer: COMMERCIAL

## 2021-12-07 DIAGNOSIS — R07.9 CHEST PAIN: ICD-10-CM

## 2021-12-07 DIAGNOSIS — R00.2 PALPITATIONS: ICD-10-CM

## 2021-12-07 DIAGNOSIS — R79.89 TROPONIN LEVEL ELEVATED: ICD-10-CM

## 2021-12-07 LAB — RHEUMATOID FACT SER NEPH-ACNC: <15 IU/ML (ref 0–30)

## 2021-12-07 PROCEDURE — 93321 DOPPLER ECHO F-UP/LMTD STD: CPT | Mod: 26 | Performed by: INTERNAL MEDICINE

## 2021-12-07 PROCEDURE — 93308 TTE F-UP OR LMTD: CPT | Mod: 26 | Performed by: INTERNAL MEDICINE

## 2021-12-07 PROCEDURE — 86147 CARDIOLIPIN ANTIBODY EA IG: CPT

## 2021-12-07 PROCEDURE — 93321 DOPPLER ECHO F-UP/LMTD STD: CPT

## 2021-12-07 PROCEDURE — 86431 RHEUMATOID FACTOR QUANT: CPT

## 2021-12-07 PROCEDURE — 85300 ANTITHROMBIN III ACTIVITY: CPT

## 2021-12-07 PROCEDURE — 93325 DOPPLER ECHO COLOR FLOW MAPG: CPT | Mod: 26 | Performed by: INTERNAL MEDICINE

## 2021-12-07 PROCEDURE — 36415 COLL VENOUS BLD VENIPUNCTURE: CPT

## 2021-12-07 PROCEDURE — 86038 ANTINUCLEAR ANTIBODIES: CPT

## 2021-12-08 LAB
ANA SER QL IF: NEGATIVE
AT III ACT/NOR PPP CHRO: 108 % (ref 85–135)
CARDIOLIPIN IGG SER IA-ACNC: <2 GPL-U/ML
CARDIOLIPIN IGG SER IA-ACNC: NEGATIVE
CARDIOLIPIN IGM SER IA-ACNC: 27 MPL-U/ML
CARDIOLIPIN IGM SER IA-ACNC: ABNORMAL

## 2021-12-10 ENCOUNTER — TELEPHONE (OUTPATIENT)
Dept: CARDIOLOGY | Facility: CLINIC | Age: 41
End: 2021-12-10
Payer: COMMERCIAL

## 2021-12-10 NOTE — TELEPHONE ENCOUNTER
[facility] Call Center    Phone Message    May a detailed message be left on voicemail: yes     Reason for Call: Appointment Intake    Referring Provider Name: Margarito Perez MD in [facility]  Diagnosis and/or Symptoms: positive cardiolipin antibody    Would Dr Shelton or Dr Holley see this pt? DX not listed on protocol.     Action Taken: Message routed to:  Other: Rheumatology Support Pool, [facility]    Travel Screening: Not Applicable

## 2021-12-14 ENCOUNTER — LAB (OUTPATIENT)
Dept: LAB | Facility: CLINIC | Age: 41
End: 2021-12-14
Attending: PHYSICIAN ASSISTANT
Payer: COMMERCIAL

## 2021-12-14 ENCOUNTER — E-VISIT (OUTPATIENT)
Dept: URGENT CARE | Facility: URGENT CARE | Age: 41
End: 2021-12-14
Payer: COMMERCIAL

## 2021-12-14 DIAGNOSIS — Z20.822 CLOSE EXPOSURE TO 2019 NOVEL CORONAVIRUS: Primary | ICD-10-CM

## 2021-12-14 DIAGNOSIS — Z20.822 CLOSE EXPOSURE TO 2019 NOVEL CORONAVIRUS: ICD-10-CM

## 2021-12-14 PROCEDURE — U0003 INFECTIOUS AGENT DETECTION BY NUCLEIC ACID (DNA OR RNA); SEVERE ACUTE RESPIRATORY SYNDROME CORONAVIRUS 2 (SARS-COV-2) (CORONAVIRUS DISEASE [COVID-19]), AMPLIFIED PROBE TECHNIQUE, MAKING USE OF HIGH THROUGHPUT TECHNOLOGIES AS DESCRIBED BY CMS-2020-01-R: HCPCS

## 2021-12-14 PROCEDURE — U0005 INFEC AGEN DETEC AMPLI PROBE: HCPCS

## 2021-12-14 PROCEDURE — 99421 OL DIG E/M SVC 5-10 MIN: CPT | Performed by: PHYSICIAN ASSISTANT

## 2021-12-14 NOTE — PATIENT INSTRUCTIONS
"  Dear Nancy Greene,    Based on your exposure to COVID-19 (coronavirus), we would like to test you for this virus. I have placed an order for this test.The best time for testing is 5-7 days after the exposure.    How to schedule:  Go to your [employer] home page and scroll down to the section that says  You have an appointment that needs to be scheduled  and click the large green button that says  Schedule Now  and follow the steps to find the next available opening.     If you are unable to complete these [employer] scheduling steps, please call [phone] to schedule your testing.     Return to work/school/ guidance:   For people with high risk exposures outside the home    Please let your workplace manager and staffing office know when your quarantine ends.     We can not give you an exact date as it depends on the information below. You can calculate this on your own or work with your manager/staffing office to calculate this. (For example if you were exposed on 10/4, you would have to quarantine for 14 full days. That would be through 10/18. You could return on 10/19.)    Quarantine Guidelines:  Patients (\"contacts\") who have been in close prolonged contact of an infected person(s) (within six feet for at least 15 minutes within a 24 hour period), and remain asymptomatic should enter quarantine based on the following options:    14-day quarantine period (this remains the CDC recommendation for the greatest protection against spread of COVID-19) OR    Minimum 7-day quarantine with negative RT-PCR test collected on day 5 or later OR    10-day quarantine with no test  Quarantine Guideline exceptions are as follows:    People who have been fully vaccinated do not need to quarantine if the exposure was at least 2 weeks after the last vaccination. This includes vaccinated health care workers.    Not fully vaccinated and unvaccinated Individuals who work in health care, congregate care, or congregate " living should be off work for 14 days from their last date of exposure. Community activities for this group can be resumed based on options above. Fully vaccinated individuals in this group do not need to quarantine from work after exposure.    Not fully vaccinated and unvaccinated people whose high-risk exposure was a household member should always quarantine for 14 days from their last date of exposure. Fully vaccinated people in this category do not need to quarantine.    Not fully vaccinated or unvaccinated residents of congregate care and congregate living settings should always quarantine for 14 days from their last date of exposure. Fully vaccinated residents do not need to quarantine.  Note: If you have ongoing exposure to the covid positive person, this quarantine period may be more than 14 days. (For example, if you are continued to be exposed to your child who tested positive and cannot isolate from them, then the quarantine of 7-14 days can't start until your child is no longer contagious. This is typically 10 days from onset of the child's symptoms. So the total duration may be 17-24 days in this case.)    You should continue symptom monitoring until day 14 post-exposure. If you develop signs or symptoms of COVID-19, isolate and get tested (even if you have been tested already).    How to quarantine:   Stay home and away from others. Don't go to school or anywhere else. Generally quarantine means staying home from work but there are some exceptions to this. Please contact your workplace.  No hugging, kissing or shaking hands.  Don't let anyone visit.  Cover your mouth and nose with a mask, tissue or washcloth to avoid spreading germs.  Wash your hands and face often. Use soap and water.    What are the symptoms of COVID-19?  The most common symptoms are cough, fever and trouble breathing. Less common symptoms include headache, body aches, fatigue (feeling very tired), chills, sore throat, stuffy or  runny nose, diarrhea (loose poop), loss of taste or smell, belly pain, and nausea or vomiting (feeling sick to your stomach or throwing up).  After 14 days, if you have still don't have symptoms, you likely don't have this virus.  If you develop symptoms, follow these guidelines.  If you're normally healthy: Please start another eVisit.  If you have a serious health problem (like cancer, heart failure, an organ transplant or kidney disease): Call your specialty clinic. Let them know that you might have COVID-19.    Where can I get more information?  [facility] - About COVID-19: [email or web address]  CDC - What to Do If You're Sick: www.cdc.gov/coronavirus/2019-ncov/about/steps-when-sick.html  CDC - Ending Home Isolation: www.cdc.gov/coronavirus/2019-ncov/hcp/disposition-in-home-patients.html  CDC - Caring for Someone: www.cdc.gov/coronavirus/2019-ncov/if-you-are-sick/care-for-someone.html  [facility] clinical trials (COVID-19 research studies): [email or web address]-[email or web address]-trials  Below are the COVID-19 hotlines at the [facility] ([facility]). Interpreters are available.  For health questions: Call 917-001-6422 or 1-210.445.9151 (7 a.m. to 7 p.m.)  For questions about schools and childcare: Call 493-367-0618 or 1-282.191.3159 (7 a.m. to 7 p.m.)        December 14, 2021  RE:  Nancy Greene                                                                                                                   [id number]  [place] [address]      To whom it may concern:    I evaluated Nancymirta Greene on December 14, 2021. Nancy Greene should be excused from work/school.    They should let their workplace manager and staffing office know when their quarantine ends.    We can not give an exact date as it depends on the information below. They can calculate this on their own or work with their manager/staffing office to calculate this. (For example if  "they were exposed on 10/04, they would have to quarantine for 14 full days. That would be through 10/18. They could return on 10/19.)    Quarantine Guidelines:    Patients (\"contacts\") who have been in close prolonged contact of an infected person(s) (within six feet for at least 15 minutes within a 24 hour period) and remain asymptomatic should enter quarantine based on the following options:      14-day quarantine period (this remains the CDC recommendation for the greatest protection against spread of COVID-19) OR    Minimum 7-day quarantine with negative RT-PCR test collected on day 5 or later OR    10-day quarantine with no test   Quarantine Guideline exceptions are as follows:    People who have been fully vaccinated do not need to quarantine if the exposure was at least 2 weeks after the last vaccination. This includes vaccinated health care workers.    Not fully vaccinated and unvaccinated Individuals who work in health care, congregate care, or congregate living should be off work for 14 days from their last date of exposure. Community activities for this group can be resumed based on options above. Fully vaccinated individuals in this group do not need to quarantine from work after exposure.    Not fully vaccinated and unvaccinated people whose high-risk exposure was a household member should always quarantine for 14 days from their last date of exposure. Fully vaccinated people in this category do not need to quarantine.    Not fully vaccinated or unvaccinated residents of congregate care and congregate living settings should always quarantine for 14 days from their last date of exposure. Fully vaccinated residents do not need to quarantine.    Note: If there is ongoing exposure to the covid positive person, this quarantine period may be longer than 14 days. (For example, if they are continually exposed to their child, who tested positive and cannot isolate from them, then the quarantine of 7-14 days " can't start until their child is no longer contagious. This is typically 10 days from onset to the child's symptoms. So the total duration may be 17-24 days in this case.)    Nancy CHAUNCEY Greene should continue symptom monitoring until day 14 post-exposure. If they develop signs or symptoms of COVID-19, they should isolate and get tested (even if they have been tested already).    Sincerely,  ADOLFO AzulC

## 2021-12-15 LAB — SARS-COV-2 RNA RESP QL NAA+PROBE: NEGATIVE

## 2021-12-17 PROCEDURE — 93272 ECG/REVIEW INTERPRET ONLY: CPT | Performed by: INTERNAL MEDICINE

## 2021-12-30 ENCOUNTER — HOSPITAL ENCOUNTER (OUTPATIENT)
Dept: ULTRASOUND IMAGING | Facility: CLINIC | Age: 41
Discharge: HOME | End: 2021-12-30
Attending: INTERNAL MEDICINE | Admitting: INTERNAL MEDICINE
Payer: COMMERCIAL

## 2021-12-30 DIAGNOSIS — R79.89 TROPONIN LEVEL ELEVATED: ICD-10-CM

## 2021-12-30 DIAGNOSIS — R07.9 CHEST PAIN: ICD-10-CM

## 2021-12-30 DIAGNOSIS — R00.2 PALPITATIONS: ICD-10-CM

## 2021-12-30 PROCEDURE — 93970 EXTREMITY STUDY: CPT

## 2022-01-08 ENCOUNTER — HEALTH MAINTENANCE LETTER (OUTPATIENT)
Age: 42
End: 2022-01-08

## 2022-01-10 ENCOUNTER — VIRTUAL VISIT (OUTPATIENT)
Dept: RHEUMATOLOGY | Facility: CLINIC | Age: 42
End: 2022-01-10
Attending: INTERNAL MEDICINE
Payer: COMMERCIAL

## 2022-01-10 DIAGNOSIS — R79.89 TROPONIN LEVEL ELEVATED: ICD-10-CM

## 2022-01-10 DIAGNOSIS — R76.0 ANTICARDIOLIPIN ANTIBODY POSITIVE: Primary | ICD-10-CM

## 2022-01-10 DIAGNOSIS — R79.82 CRP ELEVATED: ICD-10-CM

## 2022-01-10 PROCEDURE — 99204 OFFICE O/P NEW MOD 45 MIN: CPT | Mod: GT | Performed by: INTERNAL MEDICINE

## 2022-01-10 RX ORDER — ASPIRIN 81 MG/1
2 TABLET, CHEWABLE ORAL DAILY
COMMUNITY
Start: 2021-09-17 | End: 2023-01-13

## 2022-01-10 NOTE — PROGRESS NOTES
Nancy is a 41 year old who is being evaluated via a billable video visit.      How would you like to obtain your AVS? MyChart  If the video visit is dropped, the invitation should be resent by: Text to cell phone: [phone]  Will anyone else be joining your video visit? No         Video-Visit Details    Type of service:  Video Visit    Start: 01/10/2022 10:41 am   Stop: 01/10/2022 11:01 am    Originating Location (pt. Location): Home    [facility]     Platform used for Video Visit: i[employer]    This document was created using a software with less than 100% fidelity, at times resulting in unintended, even erroneous syntax and grammar.  The reader is advised to keep this under consideration while reviewing, interpreting this note.    ASSESSMENT AND PLAN:  Nancy Greene 41 year old female is seen here on 01/10/22 for evaluation of positive anticardiolipin antibodies in the context of an episode of chest pain associated with a rise in troponin without direct evidence of thrombotic/embolic event.  Her KYLIE was negative.  Her C-reactive protein was elevated in November 2021 was also elevated in May 2018.  She weighs 251 pounds at 5 feet 6 inches.  We discussed the interaction of CRP and obesity in patients where there is no alternative etiology for elevated CRP is detected.  She will have her anticardiolipin and other associated antibodies done in mid March 2022.  The reason for this is discussed.  We will get together once these data are available.          .Diagnoses and all orders for this visit:  Anticardiolipin antibody positive  -     Lupus Anticoagulant Panel; Future  -     Cardiolipin Tania IgG and IgM; Future  -     Beta 2 Glycoprotein Antibodies IGG IGM; Future  -     Phosphatidylserine Antibody IgG IgA IgM; Future  -     CRP inflammation; Future  -     Erythrocyte sedimentation rate auto; Future  -     Hepatitis C antibody; Future  -     Cyclic  Citrullinated Peptide Antibody IgG; Future  -     Rheumatoid factor; Future  Troponin level elevated  -     Rheumatology Referral  CRP elevated    HISTORY OF PRESENTING ILLNESS:  Nancy Greene, 41 year old, female is here for evaluation of positive anticardiolipin antibodies, these were done in the context of an episode of chest pain.  This was in September last year.  She developed severe, intense chest pain, called 911, she was taken to the local hospital, her initial EKG, troponin were -3 hours later the troponin had risen and the subsequent troponin was even higher.  She underwent extensive cardiac work-up including ultrasound of the lower extremities for DVT, echocardiogram, bubble study, angiography cardiac MRI, as far as she is aware and I am able to go through her there the work-up was negative for evidence of a thrombotic or embolic event.  Small vessel disease/spasm was thought to be a possible etiology for her syndrome.  She has not had those symptoms recur.  As a part of work-up she had anticardiolipin antibodies taken as well as KYLIE and C-reactive protein KYLIE was negative.  CRP was modestly elevated which has been the case since her most recent CRP drawn on 5/9/2018.  She reports that for 6 years that had difficult time conceiving, as per as she is aware there has been no episodes of miscarriages.  Which she had an uneventful pregnancy.  She reports her knees are painful first thing in the morning for a few minutes.  She reports no rash mouth ulcer photosensitivity.  She has not had pleuritic symptoms..  There is no family history of connective tissue disease that she is aware of.  She as an  and involving hiring people.  She is non-smoker alcohol rarely.      ALLERGIES:Cetirizine    PAST MEDICAL/ACTIVE PROBLEMS/MEDICATION/ FAMILY HISTORY/SOCIAL DATA:  The patient has a family history of  Past Medical History:   Diagnosis Date     Headache      Headache      Headache(784.0)       NSTEMI (non-ST elevated myocardial infarction) (H) 9/16/2021     History   Smoking Status     Never Smoker   Smokeless Tobacco     Never Used     Patient Active Problem List   Diagnosis     Headache     Allergic rhinitis due to other allergen     CARDIOVASCULAR SCREENING; LDL GOAL LESS THAN 160     Ankle pain     Chest pain     Status post coronary angiogram     Morbid obesity (H)     Current Outpatient Medications   Medication Sig Dispense Refill     calcium carbonate-vitamin D (OSCAL W/D) 500-200 MG-UNIT tablet Take 2 tablets by mouth daily       cyclobenzaprine (FLEXERIL) 10 MG tablet Take 0.5-1 tablets (5-10 mg) by mouth 3 times daily as needed for muscle spasms 30 tablet 0     fluticasone (FLONASE) 50 MCG/ACT spray Spray 2 sprays into both nostrils daily (Patient taking differently: Spray 2 sprays into both nostrils daily as needed ) 1 Bottle 11     folic acid (FOLVITE) 400 MCG tablet Take 400 mcg by mouth daily.       Magnesium Oxide 250 MG TABS Take 250 mg by mouth daily       Multiple Vitamins-Minerals (HAIR SKIN AND NAILS FORMULA PO) Take 3 tablets by mouth daily        multivitamin w/minerals (MULTI-VITAMIN) tablet Take 1 tablet by mouth daily         COMPREHENSIVE EXAMINATION:  There were no vitals filed for this visit.  A well appearing alert oriented female. Well appearing alert oriented.   Examination of the eyes revealed no redness, obvious scleromalacia.  ENT examination shows no nasal deformity such as of saddle type, external ear without signs of inflamm/deformity ation.  Cardiopulmonary examination without obvious signs of dyspnea, no wheezing is audible, no cyanosis.  There is no finger clubbing.  Skin examination performed for heliotrope, malar area eruption periungual erythema, lupus pernio.  Neurological examination shows the speech is fluent, no facial asymmetry, muscle power in the upper extremities proximally appears to be normal.  In the psychiatric examination the memory, orientation,  attention, affect were observable and normal.  Joint examination of the DIPs, PIPs, MCPs, IP joints of the thumbs, wrists, elbows, for swelling, range of motion, for shoulders range of motion evaluated.  There is no malar area eruption, she does not have swelling of the digits, she is able to fully flex them into fists, wrist elbow shoulder range of motion is full.  She does not have dactylitis.  LAB / IMAGING DATA:  ALT   Date Value Ref Range Status   11/16/2021 15 0 - 45 U/L Final   09/15/2021 10 0 - 45 U/L Final   05/09/2018 37 0 - 50 U/L Final     Albumin   Date Value Ref Range Status   11/16/2021 3.7 3.5 - 5.0 g/dL Final   09/15/2021 3.6 3.5 - 5.0 g/dL Final   05/09/2018 3.9 3.4 - 5.0 g/dL Final       WBC   Date Value Ref Range Status   05/09/2018 3.9 (L) 4.0 - 11.0 10e9/L Final   01/16/2017 10.7 4.0 - 11.0 10e9/L Final     WBC Count   Date Value Ref Range Status   11/16/2021 8.3 4.0 - 11.0 10e3/uL Final   09/16/2021 12.3 (H) 4.0 - 11.0 10e3/uL Final     Hemoglobin   Date Value Ref Range Status   11/16/2021 14.0 11.7 - 15.7 g/dL Final   09/16/2021 13.1 11.7 - 15.7 g/dL Final   09/15/2021 13.6 11.7 - 15.7 g/dL Final   05/09/2018 15.0 11.7 - 15.7 g/dL Final   01/16/2017 14.2 11.7 - 15.7 g/dL Final   07/23/2012 14.4 11.7 - 15.7 g/dL Final     Platelet Count   Date Value Ref Range Status   11/16/2021 276 150 - 450 10e3/uL Final   09/16/2021 273 150 - 450 10e3/uL Final   09/15/2021 272 150 - 450 10e3/uL Final   05/09/2018 232 150 - 450 10e9/L Final   01/16/2017 302 150 - 450 10e9/L Final   07/23/2012 261 150 - 450 10e9/L Final       No results found for: KYLIE

## 2022-03-14 ENCOUNTER — LAB (OUTPATIENT)
Dept: LAB | Facility: CLINIC | Age: 42
End: 2022-03-14
Payer: COMMERCIAL

## 2022-03-14 DIAGNOSIS — R76.0 ANTICARDIOLIPIN ANTIBODY POSITIVE: ICD-10-CM

## 2022-03-14 LAB
C REACTIVE PROTEIN LHE: 0.6 MG/DL (ref 0–0.8)
ERYTHROCYTE [SEDIMENTATION RATE] IN BLOOD BY WESTERGREN METHOD: 9 MM/HR (ref 0–20)
HCV AB SERPL QL IA: NEGATIVE
RHEUMATOID FACT SER NEPH-ACNC: <15 IU/ML (ref 0–30)

## 2022-03-14 PROCEDURE — 86147 CARDIOLIPIN ANTIBODY EA IG: CPT

## 2022-03-14 PROCEDURE — 86146 BETA-2 GLYCOPROTEIN ANTIBODY: CPT

## 2022-03-14 PROCEDURE — 36415 COLL VENOUS BLD VENIPUNCTURE: CPT

## 2022-03-14 PROCEDURE — 86140 C-REACTIVE PROTEIN: CPT

## 2022-03-14 PROCEDURE — 86803 HEPATITIS C AB TEST: CPT

## 2022-03-14 PROCEDURE — 86148 ANTI-PHOSPHOLIPID ANTIBODY: CPT

## 2022-03-14 PROCEDURE — 86431 RHEUMATOID FACTOR QUANT: CPT

## 2022-03-14 PROCEDURE — 86200 CCP ANTIBODY: CPT

## 2022-03-14 PROCEDURE — 85652 RBC SED RATE AUTOMATED: CPT

## 2022-03-14 PROCEDURE — 85613 RUSSELL VIPER VENOM DILUTED: CPT

## 2022-03-15 LAB
B2 GLYCOPROT1 IGG SERPL IA-ACNC: 0.9 U/ML
B2 GLYCOPROT1 IGM SERPL IA-ACNC: <2.4 U/ML
CARDIOLIPIN IGG SER IA-ACNC: <2 GPL-U/ML
CARDIOLIPIN IGG SER IA-ACNC: NEGATIVE
CARDIOLIPIN IGM SER IA-ACNC: 2.4 MPL-U/ML
CARDIOLIPIN IGM SER IA-ACNC: NEGATIVE
CCP AB SER IA-ACNC: <0.5 U/ML
DRVVT CONFIRM NORMALIZED RATIO: 1.15
DRVVT SCREEN MIX RATIO: 1.03
DRVVT SCREEN RATIO: 1.21
INR PPP: 1.07 (ref 0.85–1.15)
LA PPP-IMP: NEGATIVE
LUPUS INTERPRETATION: ABNORMAL
PTT RATIO: 1.05
THROMBIN TIME: 16.8 SECONDS (ref 13–19)

## 2022-03-15 PROCEDURE — 85390 FIBRINOLYSINS SCREEN I&R: CPT | Mod: 26 | Performed by: PATHOLOGY

## 2022-03-17 LAB
PS IGA SER IA-ACNC: 0 APS
PS IGG SER IA-ACNC: 0 GPS
PS IGM SER IA-ACNC: 2 MPS

## 2022-03-23 ENCOUNTER — OFFICE VISIT (OUTPATIENT)
Dept: RHEUMATOLOGY | Facility: CLINIC | Age: 42
End: 2022-03-23
Payer: COMMERCIAL

## 2022-03-23 VITALS
HEART RATE: 80 BPM | BODY MASS INDEX: 41.6 KG/M2 | DIASTOLIC BLOOD PRESSURE: 76 MMHG | WEIGHT: 250 LBS | SYSTOLIC BLOOD PRESSURE: 122 MMHG

## 2022-03-23 DIAGNOSIS — R76.0 ANTICARDIOLIPIN ANTIBODY POSITIVE: Primary | ICD-10-CM

## 2022-03-23 PROCEDURE — 99213 OFFICE O/P EST LOW 20 MIN: CPT | Performed by: INTERNAL MEDICINE

## 2022-03-23 NOTE — PROGRESS NOTES
Rheumatology follow-up visit note     Nancy is a 41 year old female presents today for follow-up.    Nancy was seen today for recheck.    Diagnoses and all orders for this visit:    Anticardiolipin antibody positive        Her repeat antiphospholipid antibodies were done in view of weakly positive anticardiolipin, lupus anticoagulant was really negative, the recheck is reassuring no positive anticardiolipin antibodies been detected.  Beta-2 glycoprotein negative.  She has other antibodies drawn in this context and those were negative as well.  She is to follow-up here on as-needed basis.     HPI    Nancy Greene, 41 year old, female is here for follow-up of virtual visit for evaluation of positive anticardiolipin antibodies, these were done in the context of an episode of chest pain.  As we had discussed on her previous visit earlier this year and the significance of single reading as a weakly positive anticardiolipin in her case, is of limited value it has to be repeated and that was done here last week.  All her antibodies in this context including anticardiolipin antibody beta-2 glycoprotein and others were normal/negative.  She does not get Raynaud's.     This was in September last year.  She developed severe, intense chest pain, called 911, she was taken to the local hospital, her initial EKG, troponin were -3 hours later the troponin had risen and the subsequent troponin was even higher.  She underwent extensive cardiac work-up including ultrasound of the lower extremities for DVT, echocardiogram, bubble study, angiography cardiac MRI, as far as she is aware and I am able to go through her there the work-up was negative for evidence of a thrombotic or embolic event.  Small vessel disease/spasm was thought to be a possible etiology for her syndrome.  She has not had those symptoms recur.  As a part of work-up she had anticardiolipin antibodies taken as well as KYLIE and C-reactive protein KYLIE was  negative.  CRP was modestly elevated which has been the case since her most recent CRP drawn on 5/9/2018.  She reports that for 6 years that had difficult time conceiving, as per as she is aware there has been no episodes of miscarriages.  Which she had an uneventful pregnancy.  She reports her knees are painful first thing in the morning for a few minutes.  She reports no rash mouth ulcer photosensitivity.  She has not had pleuritic symptoms..  There is no family history of connective tissue disease that she is aware of.  She as an  and involving hiring people.  She is non-smoker alcohol rarely.      DETAILED EXAMINATION  03/23/22  :    Vitals:    03/23/22 1446   BP: 122/76   Pulse: 80   Weight: 113.4 kg (250 lb)     Alert oriented. Head including the face is examined for malar rash, heliotropes, scarring, lupus pernio. Eyes examined for redness such as in episcleritis/scleritis, periorbital lesions.   Neck/ Face examined for parotid gland swelling, range of motion of neck.  Left upper and lower and right upper and lower extremities examined for tenderness, swelling, warmth of the appendicular joints, range of motion, edema, rash.  Some of the important findings included: she does not have evidence of synovitis in the palpable joints of the upper extremities.  No significant deformities of the digits.  no Heberden nodes.  Range of motion of the shoulders  show full abduction.  No JLT effusion or warmth of the knees.  she does not have dactylitis of the digits.     Patient Active Problem List    Diagnosis Date Noted     Morbid obesity (H) 11/09/2021     Priority: Medium     Chest pain 09/16/2021     Priority: Medium     Status post coronary angiogram 09/16/2021     Priority: Medium     Ankle pain 07/12/2013     Priority: Medium     CARDIOVASCULAR SCREENING; LDL GOAL LESS THAN 160 10/31/2010     Priority: Medium     Allergic rhinitis due to other allergen 07/23/2010     Priority: Medium     Headache  09/27/2004     Priority: Medium     Problem list name updated by automated process. Provider to review       Past Surgical History:   Procedure Laterality Date     BIOPSY       CHOLECYSTECTOMY, LAPOROSCOPIC  12/09    Cholecystectomy, Laparoscopic     CV CORONARY ANGIOGRAM WITH LV GRAM N/A 9/16/2021    Procedure: Coronary Angiogram with Left Ventricular;  Surgeon: Margarito Perez MD;  Location: [facility] CATH LAB CV     HC REMOVAL OF TONSILS,<11 Y/O       ZZC NONSPECIFIC PROCEDURE  4/03    broken nose      Past Medical History:   Diagnosis Date     Headache      Headache      Headache(784.0)      NSTEMI (non-ST elevated myocardial infarction) (H) 9/16/2021     Allergies   Allergen Reactions     Cetirizine Rash     Current Outpatient Medications   Medication Sig Dispense Refill     aspirin (ASA) 81 MG chewable tablet 2 tablets daily       calcium carbonate-vitamin D (OSCAL W/D) 500-200 MG-UNIT tablet Take 2 tablets by mouth daily       cyclobenzaprine (FLEXERIL) 10 MG tablet Take 0.5-1 tablets (5-10 mg) by mouth 3 times daily as needed for muscle spasms (Patient not taking: Reported on 1/10/2022) 30 tablet 0     fluticasone (FLONASE) 50 MCG/ACT spray Spray 2 sprays into both nostrils daily (Patient taking differently: Spray 2 sprays into both nostrils daily as needed ) 1 Bottle 11     folic acid (FOLVITE) 400 MCG tablet Take 400 mcg by mouth daily.       Magnesium Oxide 250 MG TABS Take 250 mg by mouth daily       Multiple Vitamins-Minerals (HAIR SKIN AND NAILS FORMULA PO) Take 3 tablets by mouth daily        multivitamin w/minerals (MULTI-VITAMIN) tablet Take 1 tablet by mouth daily       family history includes Breast Cancer in her paternal grandmother; Cardiovascular in her paternal grandfather; Circulatory in her paternal grandfather; Diabetes in her paternal grandfather; Eye Disorder in her paternal grandfather and paternal grandmother; Gastrointestinal Disease in her mother; Hyperlipidemia in her father  and paternal grandfather; Hypertension in her maternal grandmother and paternal grandfather; Lipids in her maternal grandmother and paternal grandfather; Neurologic Disorder in her maternal grandmother and mother; Other - See Comments in her paternal grandfather; Prostate Cancer in her paternal grandfather; Respiratory in her sister.  Social Connections: Not on file          WBC   Date Value Ref Range Status   05/09/2018 3.9 (L) 4.0 - 11.0 10e9/L Final     WBC Count   Date Value Ref Range Status   11/16/2021 8.3 4.0 - 11.0 10e3/uL Final     RBC Count   Date Value Ref Range Status   11/16/2021 4.70 3.80 - 5.20 10e6/uL Final   05/09/2018 4.97 3.8 - 5.2 10e12/L Final     Hemoglobin   Date Value Ref Range Status   11/16/2021 14.0 11.7 - 15.7 g/dL Final   05/09/2018 15.0 11.7 - 15.7 g/dL Final     Hematocrit   Date Value Ref Range Status   11/16/2021 39.6 35.0 - 47.0 % Final   05/09/2018 43.7 35.0 - 47.0 % Final     MCV   Date Value Ref Range Status   11/16/2021 84 78 - 100 fL Final   05/09/2018 88 78 - 100 fl Final     MCH   Date Value Ref Range Status   11/16/2021 29.8 26.5 - 33.0 pg Final   05/09/2018 30.2 26.5 - 33.0 pg Final     Platelet Count   Date Value Ref Range Status   11/16/2021 276 150 - 450 10e3/uL Final   05/09/2018 232 150 - 450 10e9/L Final     % Lymphocytes   Date Value Ref Range Status   09/15/2021 19 % Final   05/09/2018 24.5 % Final     AST   Date Value Ref Range Status   11/16/2021 12 0 - 40 U/L Final   05/09/2018 26 0 - 45 U/L Final     ALT   Date Value Ref Range Status   11/16/2021 15 0 - 45 U/L Final   05/09/2018 37 0 - 50 U/L Final     Albumin   Date Value Ref Range Status   11/16/2021 3.7 3.5 - 5.0 g/dL Final   05/09/2018 3.9 3.4 - 5.0 g/dL Final     Alkaline Phosphatase   Date Value Ref Range Status   11/16/2021 48 45 - 120 U/L Final   05/09/2018 49 40 - 150 U/L Final     Creatinine   Date Value Ref Range Status   11/16/2021 0.79 0.60 - 1.10 mg/dL Final   05/09/2018 0.88 0.52 - 1.04 mg/dL  Final     GFR Estimate   Date Value Ref Range Status   11/16/2021 >90 >60 mL/min/1.73m2 Final     Comment:     As of July 11, 2021, eGFR is calculated by the CKD-EPI creatinine equation, without race adjustment. eGFR can be influenced by muscle mass, exercise, and diet. The reported eGFR is an estimation only and is only applicable if the renal function is stable.   05/09/2018 73 >60 mL/min/1.7m2 Final     Comment:     Non  GFR Calc     GFR Estimate If Black   Date Value Ref Range Status   05/09/2018 88 >60 mL/min/1.7m2 Final     Comment:      GFR Calc     Sed Rate   Date Value Ref Range Status   05/09/2018 13 0 - 20 mm/h Final     Erythrocyte Sedimentation Rate   Date Value Ref Range Status   03/14/2022 9 0 - 20 mm/hr Final     CRP Inflammation   Date Value Ref Range Status   05/09/2018 8.7 (H) 0.0 - 8.0 mg/L Final     CRP   Date Value Ref Range Status   03/14/2022 0.6 0.0-<0.8 mg/dL Final

## 2022-05-09 ENCOUNTER — VIRTUAL VISIT (OUTPATIENT)
Dept: FAMILY MEDICINE | Facility: CLINIC | Age: 42
End: 2022-05-09
Payer: COMMERCIAL

## 2022-05-09 DIAGNOSIS — U07.1 COVID-19 VIRUS INFECTION: Primary | ICD-10-CM

## 2022-05-09 PROCEDURE — 99213 OFFICE O/P EST LOW 20 MIN: CPT | Mod: GT | Performed by: PHYSICIAN ASSISTANT

## 2022-05-09 NOTE — PROGRESS NOTES
"Nancy is a 41 year old who is being evaluated via a billable video visit.      How would you like to obtain your AVS? MyChart  If the video visit is dropped, the invitation should be resent by: Text to cell phone: [phone]  Will anyone else be joining your video visit? No  Video Start Time: 3:44 PM    Assessment & Plan   Problem List Items Addressed This Visit    None     Visit Diagnoses     COVID-19 virus infection    -  Primary    Relevant Medications    nirmatrelvir and ritonavir (PAXLOVID) therapy pack         Impression is COVID-19. Positive home test. 5th day of symptoms. Vaccinated and boosted x 1. Appears well and non-toxic and I have low suspicion for impending airway obstruction or respiratory distress.  She will push p.o. fluids, use over-the-counter meds for symptoms, complete a course of Paxlovid after discussing risks/benefits and follow-up with us in 2 weeks if not improving or urgent care/the ER if symptoms worsen/change at any time.     DDx and Dx discussed with and explained to the pt to their satisfaction.  All questions were answered at this time. Pt expressed understanding of and agreement with this dx, tx, and plan. No further workup warranted and standard medication warnings given. I have given the patient a list of pertinent indications for re-evaluation. Will go to the Emergency Department if symptoms worsen or new concerning symptoms arise. Patient left the call in no apparent distress.     28 minutes spent on the date of the encounter doing chart review, history and exam, documentation and further activities per the note     BMI:   Estimated body mass index is 41.6 kg/m  as calculated from the following:    Height as of 11/9/21: 1.651 m (5' 5\").    Weight as of 3/23/22: 113.4 kg (250 lb).     See Patient Instructions  COVID-19 positive patient.  Encounter for consideration of medication intervention. Patient does qualify for a prescription. Full discussion with patient including " "medication options, risks and benefits. Potential drug interactions reviewed with patient.     Treatment Planned Paxlovid, Rx sent to her [facility] pharmacy  Temporary change to home medications: she will discontinue Elderberry supplement and fluticasone for 8 days    Estimated body mass index is 41.6 kg/m  as calculated from the following:    Height as of 11/9/21: 1.651 m (5' 5\").    Weight as of 3/23/22: 113.4 kg (250 lb).  GFR Estimate   Date Value Ref Range Status   11/16/2021 >90 >60 mL/min/1.73m2 Final     Comment:     As of July 11, 2021, eGFR is calculated by the CKD-EPI creatinine equation, without race adjustment. eGFR can be influenced by muscle mass, exercise, and diet. The reported eGFR is an estimation only and is only applicable if the renal function is stable.   05/09/2018 73 >60 mL/min/1.7m2 Final     Comment:     Non  GFR Calc     Lab Results   Component Value Date    [id number] Negative 12/14/2021       Return in about 2 weeks (around 5/23/2022) for a recheck of your symptoms if not improving, or call 911/go to an ER anytime if worsening.    MIGUEL Allen  [facility] Cruz is a 41 year old who presents for the following health issues     HPI       COVID-19 Symptom Review  How many days ago did these symptoms start? evening of 5/4/22. Positive home test yesterday.    No history of COVID. Vaccinated and boosted x 1.    Are any of the following symptoms significant for you?    New or worsening difficulty breathing? No    Worsening cough? Yes, it's a dry cough.     Fever or chills? Yes, I felt feverish or had chills.    Headache: YES    Sore throat: YES    Chest pain: no    Diarrhea: YES    Body aches? YES  94bpm and 98% on RA currently by pulse oximeter.    What treatments has patient tried? Advil, OTC cold and flu   Does patient live in a nursing home, group home, or shelter? no  Does patient have a way to get food/medications during " quarantined? Yes, I have a friend or family member who can help me.    Review of Systems   Constitutional, HEENT, cardiovascular, pulmonary, gi and gu systems are negative, except as otherwise noted.      Objective           Vitals:  No vitals were obtained today due to virtual visit.    Physical Exam   GENERAL: Healthy, alert and no distress  EYES: Eyes grossly normal to inspection.  No discharge or erythema, or obvious scleral/conjunctival abnormalities.  RESP: No audible wheeze, cough, or visible cyanosis.  No visible retractions or increased work of breathing.    SKIN: Visible skin clear. No significant rash, abnormal pigmentation or lesions.  NEURO: Cranial nerves grossly intact.  Mentation and speech appropriate for age.  PSYCH: Mentation appears normal, affect normal/bright, judgement and insight intact, normal speech and appearance well-groomed.     Video-Visit Details    Type of service:  Video Visit    Video End Time:4:06 PM    Originating Location (pt. Location): Home    Distant Location (provider location):  [facility]     Platform used for Video Visit: Lon

## 2022-05-09 NOTE — PATIENT INSTRUCTIONS
Jeff Cruz,     Thank you for allowing [facility] to manage your care.     STOP YOUR FLUTICASONE NASAL SPRAY AND ELDERBERRY SUPPLEMENT FOR 8 DAYS FROM WHEN YOU START THE PAXLOVID.     If you develop worsening/changing symptoms at any time such as shortness of breath, chest pain, jaundice, changes in color of stool/urine, etc., please call 911 or go to the emergency department for evaluation.     I sent your prescriptions to your pharmacy.     For your pain, please use Ibuprofen 400mg four times daily with food. Between ibuprofen doses, you may use Tylenol 650mg.      Max acetaminophen (Tylenol) 3,000mg/24 hours  Max ibuprofen 2,000mg/24 hours     Drink 8-10 glasses of fluid daily to stay well-hydrated.     If you have any questions or concerns, please feel free to call us at [phone]     Sincerely,     Jorge Arenas PA-C     Did you know?       You can schedule a video visit for follow-up appointments as well as future appointments for certain conditions.  Please see the below link.      [email or web address]     If you have not already done so,  I encourage you to sign up for [email or web address] ([email or web address]).  This will allow you to review your results, securely communicate with a provider, and schedule virtual visits as well.

## 2022-05-27 NOTE — INTERVAL H&P NOTE
I have reviewed the surgical (or preoperative) H&P that is linked to this encounter, and examined the patient. There are no significant changes   Preseptal Cellulitis, Pediatric    Preseptal cellulitis is an infection of the eyelid and the tissues around the eye (periorbital area). This causes painful swelling and redness. This condition may also be called periorbital cellulitis.    In many cases, your child can be treated with antibiotic medicine at home. Some children, especially those 5 years of age and younger, may need to be treated in the hospital with antibiotics given through an IV. It is important to treat preseptal cellulitis right away so that it does not get worse. If it gets worse, it can spread to the eye socket and eye muscles (orbital cellulitis). Orbital cellulitis is a medical emergency.    What are the causes?    Preseptal cellulitis is most commonly caused by bacteria. In rare cases, it can be caused by a virus or fungus. The germs that cause preseptal cellulitis may come from:  •An injury near the eye, such as a scratch, animal bite, or insect bite.    •A skin rash, such as eczema or poison ivy, that becomes infected.    •An infected pimple on the eyelid (stye).    •Infection after eyelid surgery or injury.    •A sinus infection that spreads near the eyes.    What increases the risk?    Your child is more likely to develop this condition if he or she:  •Is younger than 18 months old.    •Has a weakened disease-fighting system (immune system).    •Has not received the Hib (Haemophilus influenzae type B) vaccine.    What are the signs or symptoms?     Symptoms of this condition include:  •Eyelids that are red, swollen, painful, tender, and feel unusually hot.    •Fever.    •Difficulty opening the eye.    •Headache.    •Pain in the face.    Symptoms of this condition usually develop suddenly.    How is this diagnosed?    This condition may be diagnosed based on your child's symptoms and medical history, and an eye exam. Your child may also have tests, such as:  •Blood tests.    •CT scan.    •Tests (cultures) find out which specific bacteria are causing the infection. Your child may have a culture of any open wound or drainage.    •MRI. This is less common.    How is this treated?    This condition is usually treated with antibiotics that are given by mouth (orally). In some cases, your child may be hospitalized and given antibiotics through an IV or an injection.    In rare cases, your child may also need surgery to drain an infected area.    Follow these instructions at home:    Medicines     •If your child was prescribed an antibiotic, give it as told by your child's health care provider. Do not stop giving the antibiotic even if your child starts to feel better.    •Take over-the-counter and prescription medicines only as told by your child's health care provider.    • Do not give your child aspirin because of the association with Reye syndrome.    Eye care     • Do not use eye drops without first getting approval from your child's health care provider.    •Make sure that your child:  •Does not touch or rub the eye.    •Does not wear contact lenses until his or her health care provider approves.    •Keep the eye area clean and dry.     •When bathing your child, wash the eye area with a clean washcloth, warm water, and baby shampoo or mild soap. Or, tell your child to do this when bathing.    •To help relieve discomfort, place a clean washcloth that is wet with warm water over your child's closed eye. Leave the washcloth on for a few minutes, then remove it.    General instructions     •Have your child wash his or her hands with soap and water often for at least 20 seconds. If soap and water are not available, have your child use hand . You should wash or sanitize your hands often as well.    •If your child is old enough to drive, ask your child's health care provider when it is safe for your child to drive. Do not allow your child to drive or operate machinery until your health care provider says that it is safe.    • Do not use any products that contain nicotine or tobacco, such as cigarettes, e-cigarettes, and chewing tobacco. If you need help quitting, ask your health care provider.    •Stay up to date on your child's vaccinations.    •Have your child drink enough fluid to keep his or her urine pale yellow.    •Keep all follow-up visits. This includes any visits with an eye specialist (ophthalmologist) or dentist. This is important.    Get help right away if:    •Your child develops new symptoms.    •Your child's vision becomes blurred or gets worse in any way.    •Your child's eye is sticking out or bulging out (proptosis).    •Your child has:  •Symptoms that get worse or do not get better with treatment.    •A severe headache.    •A fever.    •Neck stiffness.    •Severe neck pain.    •Trouble moving his or her eyes. For example, having difficulty or pain looking in one or more directions or develops double vision    •Your child vomits.    •Your child who is younger than 3 months has a temperature of 100.4°F (38°C) or higher.    These symptoms may represent a serious problem that is an emergency. Do not wait to see if the symptoms will go away. Get medical help right away. Call your local emergency services (911 in the [place] Do not drive yourself to the hospital.     Summary    •Preseptal cellulitis is an infection of the eyelid and the tissues around the eye.    •Symptoms of preseptal cellulitis usually develop suddenly and include pain and tenderness, swelling and redness.    •In most cases, your child can be treated with antibiotic medicine at home. Do not stop giving the antibiotic even if your child starts to feel better.    •Preseptal cellulitis can develop into orbital cellulitis, which is a medical emergency. If your child's condition does not improve or gets worse, visit your health care provider right away.    This information is not intended to replace advice given to you by your health care provider. Make sure you discuss any questions you have with your health care provider.

## 2022-07-29 ENCOUNTER — OFFICE VISIT (OUTPATIENT)
Dept: CARDIOLOGY | Facility: CLINIC | Age: 42
End: 2022-07-29
Payer: COMMERCIAL

## 2022-07-29 VITALS
DIASTOLIC BLOOD PRESSURE: 68 MMHG | HEART RATE: 75 BPM | BODY MASS INDEX: 40.27 KG/M2 | WEIGHT: 242 LBS | SYSTOLIC BLOOD PRESSURE: 104 MMHG | RESPIRATION RATE: 16 BRPM

## 2022-07-29 DIAGNOSIS — I49.3 MULTIPLE PREMATURE VENTRICULAR COMPLEXES: Primary | ICD-10-CM

## 2022-07-29 PROCEDURE — 99213 OFFICE O/P EST LOW 20 MIN: CPT | Performed by: INTERNAL MEDICINE

## 2022-07-29 RX ORDER — METOPROLOL TARTRATE 25 MG/1
25 TABLET, FILM COATED ORAL 2 TIMES DAILY PRN
Qty: 30 TABLET | Refills: 11 | Status: SHIPPED | OUTPATIENT
Start: 2022-07-29 | End: 2022-08-12

## 2022-07-29 NOTE — LETTER
7/29/2022    Deborah Mayes, NP  [address] [place]  [facility] [address]    RE: Nancy Joyner       Dear Colleague,     I had the pleasure of seeing Nancy Joyner in the [facility].    Thank you, Dr. Jerry ref. provider found, for asking the [facility] team to see Ms. Nancy Joyner to evaluate       Assessment/Recommendations   Assessment/Plan:  1. Palpitations from PVC - will provide metoprolol tartrate 25mg use q 8 hours as needed, plan 24 hr 12 lead holter if symptoms worsen, avoid champagne, coffee.  Consider EP visit if symptoms worsen after holter as well.    Follow up one year  Discussion only 20 min       History of Present Illness/Subjective    Ms. Nancy Joyner is a 42 year old female with palpitations, covid infection, PVC on holter, normal cors 9/2021 and infltirative disease on stress MRI 9/2021, discussed symptoms related to <1% PVC of variable forms, discussed diet/medications including beta blocker/antiarrhthmic medications, Mg supplements and visit [facility] EP or rferral to [place] for input.         Physical Examination Review of Systems   /68 (BP Location: Right arm, Patient Position: Sitting, Cuff Size: Adult Large)   Pulse 75   Resp 16   Wt 109.8 kg (242 lb)   BMI 40.27 kg/m      There is no height or weight on file to calculate BMI.  Wt Readings from Last 3 Encounters:   03/23/22 113.4 kg (250 lb)   11/09/21 116.6 kg (257 lb)   09/28/21 113.4 kg (250 lb)     [unfilled]  General Appearance:      ENT/Mouth:    EYES:     Neck:    Chest/Lungs:      Cardiovascular:      Abdomen:     Extremities:    Skin:    Neurologic:    Psychiatric:     Review of Systems - 12 points nega other than above      Medical History  Surgical History Family History Social History   Past Medical History:   Diagnosis Date     Headache      Headache      Headache(784.0)      NSTEMI (non-ST elevated myocardial infarction) (H) 9/16/2021    Past Surgical  History:   Procedure Laterality Date     BIOPSY       CHOLECYSTECTOMY, LAPOROSCOPIC  12/09    Cholecystectomy, Laparoscopic     CV CORONARY ANGIOGRAM WITH LV GRAM N/A 9/16/2021    Procedure: Coronary Angiogram with Left Ventricular;  Surgeon: Margarito Perez MD;  Location: [facility] LAB CV     HC REMOVAL OF TONSILS,<13 Y/O       ZZC NONSPECIFIC PROCEDURE  4/03    broken nose    Family History   Problem Relation Age of Onset     Gastrointestinal Disease Mother         ulcers     Neurologic Disorder Mother         migraines     Hyperlipidemia Father      Cardiovascular Paternal Grandfather         2 MI     Lipids Paternal Grandfather      Hypertension Paternal Grandfather      Circulatory Paternal Grandfather         blood clots in legs     Prostate Cancer Paternal Grandfather      Eye Disorder Paternal Grandfather         glaucoma     Diabetes Paternal Grandfather      Hyperlipidemia Paternal Grandfather      Other - See Comments Paternal Grandfather         enlarged heart and blood clotting issues. History of heart attacks     Lipids Maternal Grandmother      Hypertension Maternal Grandmother      Neurologic Disorder Maternal Grandmother         migraines/brain aneurysm     Respiratory Sister         asthma     Eye Disorder Paternal Grandmother         glaucoma     Breast Cancer Paternal Grandmother     Social History     Socioeconomic History     Marital status:      Spouse name: Not on file     Number of children: Not on file     Years of education: Not on file     Highest education level: Not on file   Occupational History     Not on file   Tobacco Use     Smoking status: Never Smoker     Smokeless tobacco: Never Used   Vaping Use     Vaping Use: Never used   Substance and Sexual Activity     Alcohol use: Not Currently     Comment: 2/mo     Drug use: No     Sexual activity: Yes     Partners: Male     Birth control/protection: Condom     Comment: Depo provera-   Other Topics Concern       Service No     Blood Transfusions No     Caffeine Concern No     Occupational Exposure No     Hobby Hazards No     Sleep Concern No     Stress Concern No     Weight Concern Yes     Comment: trying to lose alter diet not going well     Special Diet No     Back Care No     Exercise Yes     Bike Helmet Yes     Seat Belt Yes     Self-Exams Yes     Parent/sibling w/ CABG, MI or angioplasty before 65F 55M? No   Social History Narrative    Dairy/d 1 servings/d. plus a calcium supplement    Caffeine 0 servings/d    Exercise 3 x week walks     Sunscreen used - Yes    Seatbelts used - Yes    Working smoke/CO detectors in the home - Yes    Guns stored in the home - No    Self Breast Exams - Yes    Self Testicular Exam - NA    Eye Exam up to date - yes    Dental Exam up to date - Yes    Pap Smear up to date -no    Mammogram up to date - NA    PSA up to date - NA    Dexa Scan up to date - NA    Flex Sig / Colonoscopy up to date - NA    Immunizations up to date - Yes-Td 2008    Abuse: Current or Past(Physical, Sexual or Emotional)- No    Do you feel safe in your environment - Yes                 Social Determinants of Health     Financial Resource Strain: Not on file   Food Insecurity: Not on file   Transportation Needs: Not on file   Physical Activity: Not on file   Stress: Not on file   Social Connections: Not on file   Intimate Partner Violence: Not on file   Housing Stability: Not on file          Medications  Allergies   Scheduled Meds:  Continuous Infusions:  PRN Meds:. Allergies   Allergen Reactions     Cetirizine Rash         Lab Results    Chemistry/lipid CBC Cardiac Enzymes/BNP/TSH/INR   Lab Results   Component Value Date    CHOL 160 11/16/2021    HDL 42 (L) 11/16/2021    TRIG 136 11/16/2021    BUN 10 11/16/2021     11/16/2021    CO2 21 (L) 11/16/2021    Lab Results   Component Value Date    WBC 8.3 11/16/2021    HGB 14.0 11/16/2021    HCT 39.6 11/16/2021    MCV 84 11/16/2021     11/16/2021    Lab  Results   Component Value Date    TROPONINI 2.83 (HH) 09/16/2021    TSH 1.69 01/16/2017    INR 1.07 03/14/2022              Margarito Perez MD  Interventional Cardiology  [facility]    Thank you for allowing me to participate in the care of your patient.      Sincerely,     Margarito Perez MD     [facility]  cc:   No referring provider defined for this encounter.

## 2022-07-29 NOTE — PROGRESS NOTES
Thank you, Dr. Jerry ref. provider found, for asking the [facility] team to see Ms. Nancy Joyner to evaluate       Assessment/Recommendations   Assessment/Plan:  1. Palpitations from PVC - will provide metoprolol tartrate 25mg use q 8 hours as needed, plan 24 hr 12 lead holter if symptoms worsen, avoid champagne, coffee.  Consider EP visit if symptoms worsen after holter as well.    Follow up one year  Discussion only 20 min       History of Present Illness/Subjective    Ms. Nancy Joyner is a 42 year old female with palpitations, covid infection, PVC on holter, normal cors 9/2021 and infltirative disease on stress MRI 9/2021, discussed symptoms related to <1% PVC of variable forms, discussed diet/medications including beta blocker/antiarrhthmic medications, Mg supplements and visit [facility] EP or rferral to [place] for input.         Physical Examination Review of Systems   /68 (BP Location: Right arm, Patient Position: Sitting, Cuff Size: Adult Large)   Pulse 75   Resp 16   Wt 109.8 kg (242 lb)   BMI 40.27 kg/m      There is no height or weight on file to calculate BMI.  Wt Readings from Last 3 Encounters:   03/23/22 113.4 kg (250 lb)   11/09/21 116.6 kg (257 lb)   09/28/21 113.4 kg (250 lb)     [unfilled]  General Appearance:      ENT/Mouth:    EYES:     Neck:    Chest/Lungs:      Cardiovascular:      Abdomen:     Extremities:    Skin:    Neurologic:    Psychiatric:     Review of Systems - 12 points nega other than above      Medical History  Surgical History Family History Social History   Past Medical History:   Diagnosis Date     Headache      Headache      Headache(784.0)      NSTEMI (non-ST elevated myocardial infarction) (H) 9/16/2021    Past Surgical History:   Procedure Laterality Date     BIOPSY       CHOLECYSTECTOMY, LAPOROSCOPIC  12/09    Cholecystectomy, Laparoscopic     CV CORONARY ANGIOGRAM WITH LV GRAM N/A 9/16/2021    Procedure: Coronary Angiogram with Left  Ventricular;  Surgeon: Margarito Perez MD;  Location: [facility] LAB CV     HC REMOVAL OF TONSILS,<11 Y/O       ZZC NONSPECIFIC PROCEDURE  4/03    broken nose    Family History   Problem Relation Age of Onset     Gastrointestinal Disease Mother         ulcers     Neurologic Disorder Mother         migraines     Hyperlipidemia Father      Cardiovascular Paternal Grandfather         2 MI     Lipids Paternal Grandfather      Hypertension Paternal Grandfather      Circulatory Paternal Grandfather         blood clots in legs     Prostate Cancer Paternal Grandfather      Eye Disorder Paternal Grandfather         glaucoma     Diabetes Paternal Grandfather      Hyperlipidemia Paternal Grandfather      Other - See Comments Paternal Grandfather         enlarged heart and blood clotting issues. History of heart attacks     Lipids Maternal Grandmother      Hypertension Maternal Grandmother      Neurologic Disorder Maternal Grandmother         migraines/brain aneurysm     Respiratory Sister         asthma     Eye Disorder Paternal Grandmother         glaucoma     Breast Cancer Paternal Grandmother     Social History     Socioeconomic History     Marital status:      Spouse name: Not on file     Number of children: Not on file     Years of education: Not on file     Highest education level: Not on file   Occupational History     Not on file   Tobacco Use     Smoking status: Never Smoker     Smokeless tobacco: Never Used   Vaping Use     Vaping Use: Never used   Substance and Sexual Activity     Alcohol use: Not Currently     Comment: 2/mo     Drug use: No     Sexual activity: Yes     Partners: Male     Birth control/protection: Condom     Comment: Depo provera-   Other Topics Concern      Service No     Blood Transfusions No     Caffeine Concern No     Occupational Exposure No     Hobby Hazards No     Sleep Concern No     Stress Concern No     Weight Concern Yes     Comment: trying to lose alter  diet not going well     Special Diet No     Back Care No     Exercise Yes     Bike Helmet Yes     Seat Belt Yes     Self-Exams Yes     Parent/sibling w/ CABG, MI or angioplasty before 65F 55M? No   Social History Narrative    Dairy/d 1 servings/d. plus a calcium supplement    Caffeine 0 servings/d    Exercise 3 x week walks     Sunscreen used - Yes    Seatbelts used - Yes    Working smoke/CO detectors in the home - Yes    Guns stored in the home - No    Self Breast Exams - Yes    Self Testicular Exam - NA    Eye Exam up to date - yes    Dental Exam up to date - Yes    Pap Smear up to date -no    Mammogram up to date - NA    PSA up to date - NA    Dexa Scan up to date - NA    Flex Sig / Colonoscopy up to date - NA    Immunizations up to date - Yes-Td 2008    Abuse: Current or Past(Physical, Sexual or Emotional)- No    Do you feel safe in your environment - Yes                 Social Determinants of Health     Financial Resource Strain: Not on file   Food Insecurity: Not on file   Transportation Needs: Not on file   Physical Activity: Not on file   Stress: Not on file   Social Connections: Not on file   Intimate Partner Violence: Not on file   Housing Stability: Not on file          Medications  Allergies   Scheduled Meds:  Continuous Infusions:  PRN Meds:. Allergies   Allergen Reactions     Cetirizine Rash         Lab Results    Chemistry/lipid CBC Cardiac Enzymes/BNP/TSH/INR   Lab Results   Component Value Date    CHOL 160 11/16/2021    HDL 42 (L) 11/16/2021    TRIG 136 11/16/2021    BUN 10 11/16/2021     11/16/2021    CO2 21 (L) 11/16/2021    Lab Results   Component Value Date    WBC 8.3 11/16/2021    HGB 14.0 11/16/2021    HCT 39.6 11/16/2021    MCV 84 11/16/2021     11/16/2021    Lab Results   Component Value Date    TROPONINI 2.83 (HH) 09/16/2021    TSH 1.69 01/16/2017    INR 1.07 03/14/2022              Margarito Perez MD  Interventional Cardiology  [facility]

## 2022-08-12 ENCOUNTER — TELEPHONE (OUTPATIENT)
Dept: CARDIOLOGY | Facility: CLINIC | Age: 42
End: 2022-08-12

## 2022-08-12 DIAGNOSIS — I49.3 MULTIPLE PREMATURE VENTRICULAR COMPLEXES: ICD-10-CM

## 2022-08-12 RX ORDER — METOPROLOL TARTRATE 25 MG/1
25 TABLET, FILM COATED ORAL 2 TIMES DAILY PRN
Qty: 180 TABLET | Refills: 3 | Status: SHIPPED | OUTPATIENT
Start: 2022-08-12

## 2022-08-31 DIAGNOSIS — I49.3 MULTIPLE PREMATURE VENTRICULAR COMPLEXES: ICD-10-CM

## 2022-08-31 RX ORDER — METOPROLOL TARTRATE 25 MG/1
25 TABLET, FILM COATED ORAL 2 TIMES DAILY PRN
Qty: 180 TABLET | Refills: 3 | Status: CANCELLED | OUTPATIENT
Start: 2022-08-31

## 2022-11-20 ENCOUNTER — HEALTH MAINTENANCE LETTER (OUTPATIENT)
Age: 42
End: 2022-11-20

## 2022-12-09 ENCOUNTER — TRANSCRIBE ORDERS (OUTPATIENT)
Dept: OTHER | Age: 42
End: 2022-12-09

## 2022-12-09 DIAGNOSIS — M70.62 TROCHANTERIC BURSITIS, LEFT HIP: ICD-10-CM

## 2022-12-09 DIAGNOSIS — M25.552 LEFT HIP PAIN: Primary | ICD-10-CM

## 2023-01-12 ENCOUNTER — NURSE TRIAGE (OUTPATIENT)
Dept: FAMILY MEDICINE | Facility: CLINIC | Age: 43
End: 2023-01-12

## 2023-01-12 DIAGNOSIS — U07.1 INFECTION DUE TO 2019 NOVEL CORONAVIRUS: Primary | ICD-10-CM

## 2023-01-13 NOTE — TELEPHONE ENCOUNTER
RN COVID TREATMENT VISIT  01/13/23    Nancy Joyner  42 year old  Current weight? 250 lb.    Has the patient been seen by a primary care provider at an [facility] or [facility] within the past two years? Yes.   Have you been in close proximity to/do you have a known exposure to a person with a confirmed case of influenza? No.     Date of positive COVID test (PCR or at home)?  1/12/23    Current COVID symptoms: fever or chills, cough, shortness of breath or difficulty breathing, fatigue, muscle or body aches, headache, sore throat and congestion or runny nose    Date COVID symptoms began: 1/11/23    Do you have any of the following conditions that place you at risk of being very sick from COVID-19? heart conditions and overweight (BMI>25)    Is patient eligible to continue? Yes, established patient, 12 years or older weighing at least 88.2 lbs, who has COVID symptoms that started in the past 5 days and is at risk for being very sick from COVID-19.       Have you received monoclonal antibodies or oral antiviral medications since testing positive to COVID-19? No    Are you currently hospitalized for COVID-19? No    Do you have a history of hepatitis? No    Are you currently pregnant or nursing? No    Do you have a clinically significant hypersensitivity to nirmatrelvir, ritonavir, or molnupiravir? No    Do you have any history of severe renal impairment (eGFR < 30mL/min)? No    Do you have any history of hepatic impairment or abnormalities (e.g. hepatic panel, ALT, AST, ALK Phos, bilirubin)? No    Have you had a coronary stent placed in the previous 6 months? No    Is patient eligible to continue?   Yes, patient meets all eligibility requirements for the RN COVID treatment (as denoted by all no responses above).     Current Outpatient Medications   Medication Sig Dispense Refill     aspirin (ASA) 81 MG chewable tablet 2 tablets daily       calcium carbonate-vitamin D (OSCAL W/D)  500-200 MG-UNIT tablet Take 2 tablets by mouth daily       cyclobenzaprine (FLEXERIL) 10 MG tablet Take 0.5-1 tablets (5-10 mg) by mouth 3 times daily as needed for muscle spasms (Patient not taking: No sig reported) 30 tablet 0     fluticasone (FLONASE) 50 MCG/ACT spray Spray 2 sprays into both nostrils daily 1 Bottle 11     folic acid (FOLVITE) 400 MCG tablet Take 400 mcg by mouth daily.       Magnesium Oxide 250 MG TABS Take 400 mg by mouth daily       metoprolol tartrate (LOPRESSOR) 25 MG tablet Take 1 tablet (25 mg) by mouth 2 times daily as needed (palpitations) 180 tablet 3     Multiple Vitamins-Minerals (HAIR SKIN AND NAILS FORMULA PO) Take 3 tablets by mouth daily        multivitamin w/minerals (THERA-VIT-M) tablet Take 1 tablet by mouth daily         Medications from List 1 of the standing order (on medications that exclude the use of Paxlovid) that patient is taking: NONE. Is patient taking Jj's Wort? No  Is patient taking Jj's Wort or any meds from List 1? No.   Medications from List 2 of the standing order (on meds that provider needs to adjust) that patient is taking: NONE. Is patient on any of the meds from List 2? No.   Medications from List 3 of standing order (on meds that a RN needs to adjust) that patient is taking: NONE. Is patient on any meds from List 3? No.   In order of efficacy, Paxlovid has an approximate 90% reduction in hospitalization. Paxlovid can possibly cause altered sense of taste, diarrhea (loose, watery stools), high blood pressure, muscle aches.  The other option is molnupiravir which has an approximate 30% reduction in hospitalization. Molnupirarivr can possibly cause diarrhea (loose, watery stools), nausea (feeling sick to your stomach), dizziness, headaches.    Which treatment option does the patient prefer?   Paxlovid.   Lab Results   Component Value Date    GFRESTIMATED >90 11/16/2021       Was last eGFR reduced? No, eGFR 60 or greater/ No Result on record.  Patient can receive the normal renal function dose. Paxlovid Rx sent to [place] [facility]       Temporary change to home medications: None    All medication adjustments (holds, etc) were discussed with the patient and patient was asked to repeat back (teachback) their med adjustment.  Did patient understand med adjustment? No medication adjustments needed.         Reviewed the following instructions with the patient:    Paxlovid (nimatrelvir and ritonavir)    How it works  Two medicines (nirmatrelvir and ritonavir) are taken together. They stop the virus from growing. Less amount of virus is easier for your body to fight.    How to take    Medicine comes in a daily container with both medicine tablets. Take by mouth twice daily (once in the morning, once at night) for 5 days.    The number of tablets to take varies by patient.    Don't chew or break capsules. Swallow whole.    When to take  Take as soon as possible after positive COVID-19 test result, and within 5 days of your first symptoms.    Possible side effects  Can cause altered sense of taste, diarrhea (loose, watery stools), high blood pressure, muscle aches.    Tobi Burrell RN                 Additional Information    Negative: SEVERE difficulty breathing (e.g., struggling for each breath, speaks in single words)    Negative: Difficult to awaken or acting confused (e.g., disoriented, slurred speech)    Negative: Bluish (or gray) lips or face now    Negative: Shock suspected (e.g., cold/pale/clammy skin, too weak to stand, low BP, rapid pulse)    Negative: Sounds like a life-threatening emergency to the triager    Negative: [1] Diagnosed or suspected COVID-19 AND [2] symptoms lasting 3 or more weeks    Negative: [1] COVID-19 exposure AND [2] no symptoms    Negative: COVID-19 vaccine reaction suspected (e.g., fever, headache, muscle aches) occurring 1 to 3 days after getting vaccine    Negative: COVID-19 vaccine, questions about    Negative: [1]  Lives with someone known to have influenza (flu test positive) AND [2] flu-like symptoms (e.g., cough, runny nose, sore throat, SOB; with or without fever)    Negative: [1] Adult with possible COVID-19 symptoms AND [2] triager concerned about severity of symptoms or other causes    Negative: COVID-19 and breastfeeding, questions about    Negative: SEVERE or constant chest pain or pressure  (Exception: Mild central chest pain, present only when coughing.)    Negative: MODERATE difficulty breathing (e.g., speaks in phrases, SOB even at rest, pulse 100-120)    Negative: [1] Headache AND [2] stiff neck (can't touch chin to chest)    Negative: Oxygen level (e.g., pulse oximetry) 90 percent or lower    Negative: Chest pain or pressure    Negative: Patient sounds very sick or weak to the triager    Negative: MILD difficulty breathing (e.g., minimal/no SOB at rest, SOB with walking, pulse <100)    Negative: Fever > 103 F (39.4 C)    Negative: [1] Fever > 101 F (38.3 C) AND [2] age > 60 years    Negative: [1] Fever > 100.0 F (37.8 C) AND [2] bedridden (e.g., nursing home patient, CVA, chronic illness, recovering from surgery)    Negative: HIGH RISK for severe COVID complications (e.g., weak immune system, age > 64 years, obesity with BMI > 25, pregnant, chronic lung disease or other chronic medical condition)  (Exception: Already seen by PCP and no new or worsening symptoms.)    Negative: [1] HIGH RISK patient AND [2] influenza is widespread in the community AND [3] ONE OR MORE respiratory symptoms: cough, sore throat, runny or stuffy nose    Negative: [1] HIGH RISK patient AND [2] influenza exposure within the last 7 days AND [3] ONE OR MORE respiratory symptoms: cough, sore throat, runny or stuffy nose    Negative: Oxygen level (e.g., pulse oximetry) 91 to 94 percent    Negative: Fever present > 3 days (72 hours)    Negative: [1] Fever returns after gone for over 24 hours AND [2] symptoms worse or not improved    Negative:  "[1] Continuous (nonstop) coughing interferes with work or school AND [2] no improvement using cough treatment per Care Advice    Negative: [1] COVID-19 infection suspected by caller or triager AND [2] mild symptoms (cough, fever, or others) AND [3] negative COVID-19 rapid test    Negative: Cough present > 3 weeks    Negative: [1] COVID-19 diagnosed by positive lab test (e.g., PCR, rapid self-test kit) AND [2] NO symptoms (e.g., cough, fever, others)    Negative: [1] COVID-19 diagnosed by positive lab test (e.g., PCR, rapid self-test kit) AND [2] mild symptoms (e.g., cough, fever, others) AND [3] no complications or SOB    Negative: [1] COVID-19 diagnosed by doctor (or NP/PA) AND [2] mild symptoms (e.g., cough, fever, others) AND [3] no complications or SOB    Negative: [1] COVID-19 diagnosed AND [2] has mild nausea, vomiting or diarrhea    Negative: [1] COVID-19 infection suspected by caller or triager AND [2] mild symptoms (cough, fever, or others) AND [3] has not gotten tested yet    Negative: COVID-19 Home Isolation, questions about    Negative: COVID-19 Testing, questions about    Negative: COVID-19 Prevention and Healthy Living, questions about    Negative: COVID-19 Disease, questions about    Answer Assessment - Initial Assessment Questions  1. COVID-19 DIAGNOSIS: \"Who made your COVID-19 diagnosis?\" \"Was it confirmed by a positive lab test or self-test?\" If not diagnosed by a doctor (or NP/PA), ask \"Are there lots of cases (community spread) where you live?\" Note: See public health department website, if unsure.      Home test on 1/12/23.  2. COVID-19 EXPOSURE: \"Was there any known exposure to COVID before the symptoms began?\" CDC Definition of close contact: within 6 feet (2 meters) for a total of 15 minutes or more over a 24-hour period.       no  3. ONSET: \"When did the COVID-19 symptoms start?\"       1/11/23  4. WORST SYMPTOM: \"What is your worst symptom?\" (e.g., cough, fever, shortness of breath, muscle " "aches)      Sinus pressure in face.  5. COUGH: \"Do you have a cough?\" If Yes, ask: \"How bad is the cough?\"        Increased cough.  6. FEVER: \"Do you have a fever?\" If Yes, ask: \"What is your temperature, how was it measured, and when did it start?\"      Yesterday was 99.0  7. RESPIRATORY STATUS: \"Describe your breathing?\" (e.g., shortness of breath, wheezing, unable to speak)       Some sob with a lot of movement.  8. BETTER-SAME-WORSE: \"Are you getting better, staying the same or getting worse compared to yesterday?\"  If getting worse, ask, \"In what way?\"      Same.  9. HIGH RISK DISEASE: \"Do you have any chronic medical problems?\" (e.g., asthma, heart or lung disease, weak immune system, obesity, etc.)      Obesity, PVCs. Heart event in 2021. No MI.  10. VACCINE: \"Have you had the COVID-19 vaccine?\" If Yes, ask: \"Which one, how many shots, when did you get it?\"        Yes. Booster in 11/2022.  11. BOOSTER: \"Have you received your COVID-19 booster?\" If Yes, ask: \"Which one and when did you get it?\"        Yes. 11/2022  12. PREGNANCY: \"Is there any chance you are pregnant?\" \"When was your last menstrual period?\"        no  13. OTHER SYMPTOMS: \"Do you have any other symptoms?\"  (e.g., chills, fatigue, headache, loss of smell or taste, muscle pain, sore throat)        Muscle aches, h/a, chills, sore throat.  14. O2 SATURATION MONITOR:  \"Do you use an oxygen saturation monitor (pulse oximeter) at home?\" If Yes, ask \"What is your reading (oxygen level) today?\" \"What is your usual oxygen saturation reading?\" (e.g., 95%)  97%    Protocols used: CORONAVIRUS (COVID-19) DIAGNOSED OR [id number] 1.18.2022    "

## 2023-01-13 NOTE — TELEPHONE ENCOUNTER
From Wallerius[place]andler dated 1/12/23-  What was the date of your positive COVID test (PCR or at home)?      1/12/2023 @ home test     What date did your symptoms begin? 1/11/2023     Which COVID symptoms are you currently experiencing (such as fever, cough, headache, sore throat, etc)?      Sinus pressure, runny nose, slight sore throat, throat feels swollen so hard to swallow, headache, face ache, slight body aches, temp at 99, a few coughs, elevated heart rate while sitting     What condition(s) do you have that place you at risk of being very sick from COVID-19? https://www.cdc.gov/coronavirus/2019-ncov/need-extra-precautions/people-with-medical-conditions.html     Obesity and Pre-ventrical contractions

## 2023-04-15 ENCOUNTER — HEALTH MAINTENANCE LETTER (OUTPATIENT)
Age: 43
End: 2023-04-15

## 2023-11-30 NOTE — TELEPHONE ENCOUNTER
Mom called to discuss test results. Please call. Triage RN,    Please see mychart from 1/12 and call pt about possible antiviral rx.    She does have high BMI    Sx started 1/11    Sara Tenorio, RN, BSN  [facility]

## 2024-02-03 ENCOUNTER — HEALTH MAINTENANCE LETTER (OUTPATIENT)
Age: 44
End: 2024-02-03

## 2024-02-22 ENCOUNTER — E-VISIT (OUTPATIENT)
Dept: FAMILY MEDICINE | Facility: CLINIC | Age: 44
End: 2024-02-22
Payer: COMMERCIAL

## 2024-02-22 DIAGNOSIS — B37.31 CANDIDAL VULVOVAGINITIS: Primary | ICD-10-CM

## 2024-02-22 PROCEDURE — 99421 OL DIG E/M SVC 5-10 MIN: CPT | Performed by: NURSE PRACTITIONER

## 2024-02-26 RX ORDER — FLUCONAZOLE 150 MG/1
150 TABLET ORAL ONCE
Qty: 1 TABLET | Refills: 0 | Status: SHIPPED | OUTPATIENT
Start: 2024-02-26 | End: 2024-02-26

## 2024-06-22 ENCOUNTER — HEALTH MAINTENANCE LETTER (OUTPATIENT)
Age: 44
End: 2024-06-22

## 2025-05-23 ENCOUNTER — HOSPITAL ENCOUNTER (EMERGENCY)
Facility: CLINIC | Age: 45
Discharge: HOME | End: 2025-05-23
Attending: EMERGENCY MEDICINE | Admitting: EMERGENCY MEDICINE
Payer: COMMERCIAL

## 2025-05-23 ENCOUNTER — APPOINTMENT (OUTPATIENT)
Dept: CT IMAGING | Facility: CLINIC | Age: 45
End: 2025-05-23
Attending: EMERGENCY MEDICINE
Payer: COMMERCIAL

## 2025-05-23 VITALS
SYSTOLIC BLOOD PRESSURE: 133 MMHG | TEMPERATURE: 97.4 F | DIASTOLIC BLOOD PRESSURE: 82 MMHG | HEART RATE: 85 BPM | OXYGEN SATURATION: 100 % | RESPIRATION RATE: 18 BRPM

## 2025-05-23 DIAGNOSIS — K11.21 PAROTITIS, ACUTE: ICD-10-CM

## 2025-05-23 LAB
ANION GAP SERPL CALCULATED.3IONS-SCNC: 11 MMOL/L (ref 7–15)
BASOPHILS # BLD AUTO: 0.1 10E3/UL (ref 0–0.2)
BASOPHILS NFR BLD AUTO: 1 %
BUN SERPL-MCNC: 11.9 MG/DL (ref 6–20)
CALCIUM SERPL-MCNC: 9 MG/DL (ref 8.8–10.4)
CHLORIDE SERPL-SCNC: 105 MMOL/L (ref 98–107)
CREAT SERPL-MCNC: 0.93 MG/DL (ref 0.51–0.95)
CRP SERPL-MCNC: 14.7 MG/L
EGFRCR SERPLBLD CKD-EPI 2021: 78 ML/MIN/1.73M2
EOSINOPHIL # BLD AUTO: 0.1 10E3/UL (ref 0–0.7)
EOSINOPHIL NFR BLD AUTO: 1 %
ERYTHROCYTE [DISTWIDTH] IN BLOOD BY AUTOMATED COUNT: 13.2 % (ref 10–15)
GLUCOSE SERPL-MCNC: 109 MG/DL (ref 70–99)
HCO3 SERPL-SCNC: 24 MMOL/L (ref 22–29)
HCT VFR BLD AUTO: 40.3 % (ref 35–47)
HGB BLD-MCNC: 13.4 G/DL (ref 11.7–15.7)
IMM GRANULOCYTES # BLD: 0.1 10E3/UL
IMM GRANULOCYTES NFR BLD: 1 %
LYMPHOCYTES # BLD AUTO: 3.3 10E3/UL (ref 0.8–5.3)
LYMPHOCYTES NFR BLD AUTO: 27 %
MCH RBC QN AUTO: 29.3 PG (ref 26.5–33)
MCHC RBC AUTO-ENTMCNC: 33.3 G/DL (ref 31.5–36.5)
MCV RBC AUTO: 88 FL (ref 78–100)
MONOCYTES # BLD AUTO: 0.6 10E3/UL (ref 0–1.3)
MONOCYTES NFR BLD AUTO: 5 %
MONOCYTES NFR BLD AUTO: NEGATIVE %
NEUTROPHILS # BLD AUTO: 7.8 10E3/UL (ref 1.6–8.3)
NEUTROPHILS NFR BLD AUTO: 66 %
NRBC # BLD AUTO: 0 10E3/UL
NRBC BLD AUTO-RTO: 0 /100
PLATELET # BLD AUTO: 300 10E3/UL (ref 150–450)
POTASSIUM SERPL-SCNC: 4.2 MMOL/L (ref 3.4–5.3)
RBC # BLD AUTO: 4.58 10E6/UL (ref 3.8–5.2)
SODIUM SERPL-SCNC: 140 MMOL/L (ref 135–145)
WBC # BLD AUTO: 11.9 10E3/UL (ref 4–11)

## 2025-05-23 PROCEDURE — 86140 C-REACTIVE PROTEIN: CPT | Performed by: EMERGENCY MEDICINE

## 2025-05-23 PROCEDURE — 250N000011 HC RX IP 250 OP 636: Performed by: EMERGENCY MEDICINE

## 2025-05-23 PROCEDURE — 85025 COMPLETE CBC W/AUTO DIFF WBC: CPT | Performed by: EMERGENCY MEDICINE

## 2025-05-23 PROCEDURE — 96374 THER/PROPH/DIAG INJ IV PUSH: CPT | Mod: 59

## 2025-05-23 PROCEDURE — 82310 ASSAY OF CALCIUM: CPT | Performed by: EMERGENCY MEDICINE

## 2025-05-23 PROCEDURE — 250N000011 HC RX IP 250 OP 636: Mod: JZ | Performed by: EMERGENCY MEDICINE

## 2025-05-23 PROCEDURE — 36415 COLL VENOUS BLD VENIPUNCTURE: CPT | Performed by: EMERGENCY MEDICINE

## 2025-05-23 PROCEDURE — 99285 EMERGENCY DEPT VISIT HI MDM: CPT | Mod: 25

## 2025-05-23 PROCEDURE — 70491 CT SOFT TISSUE NECK W/DYE: CPT

## 2025-05-23 PROCEDURE — 250N000013 HC RX MED GY IP 250 OP 250 PS 637: Performed by: EMERGENCY MEDICINE

## 2025-05-23 PROCEDURE — 86308 HETEROPHILE ANTIBODY SCREEN: CPT | Performed by: EMERGENCY MEDICINE

## 2025-05-23 RX ORDER — KETOROLAC TROMETHAMINE 15 MG/ML
15 INJECTION, SOLUTION INTRAMUSCULAR; INTRAVENOUS ONCE
Status: COMPLETED | OUTPATIENT
Start: 2025-05-23 | End: 2025-05-23

## 2025-05-23 RX ORDER — IOPAMIDOL 755 MG/ML
90 INJECTION, SOLUTION INTRAVASCULAR ONCE
Status: COMPLETED | OUTPATIENT
Start: 2025-05-23 | End: 2025-05-23

## 2025-05-23 RX ADMIN — KETOROLAC TROMETHAMINE 15 MG: 15 INJECTION, SOLUTION INTRAMUSCULAR; INTRAVENOUS at 22:10

## 2025-05-23 RX ADMIN — AMOXICILLIN AND CLAVULANATE POTASSIUM 1 TABLET: 875; 125 TABLET, FILM COATED ORAL at 23:41

## 2025-05-23 RX ADMIN — IOPAMIDOL 90 ML: 755 INJECTION, SOLUTION INTRAVENOUS at 22:31

## 2025-05-23 ASSESSMENT — COLUMBIA-SUICIDE SEVERITY RATING SCALE - C-SSRS
1. IN THE PAST MONTH, HAVE YOU WISHED YOU WERE DEAD OR WISHED YOU COULD GO TO SLEEP AND NOT WAKE UP?: NO
6. HAVE YOU EVER DONE ANYTHING, STARTED TO DO ANYTHING, OR PREPARED TO DO ANYTHING TO END YOUR LIFE?: NO
2. HAVE YOU ACTUALLY HAD ANY THOUGHTS OF KILLING YOURSELF IN THE PAST MONTH?: NO

## 2025-05-23 ASSESSMENT — ACTIVITIES OF DAILY LIVING (ADL)
ADLS_ACUITY_SCORE: 46
ADLS_ACUITY_SCORE: 46

## 2025-05-24 NOTE — DISCHARGE INSTRUCTIONS
Tylenol 650 mg every 4 hours as needed for pain  Ibuprofen 600 mg every 6 hours as needed for pain  If symptoms worsen then you should go to one of the bigger emergency departments like [facility], or [place] since they have ENT on staff  A heating pad may help relieve your discomfort

## 2025-05-24 NOTE — ED TRIAGE NOTES
Pt presents to the ED with c/o left sided facial and neck pain. Pt reports symptoms first started on Tuesday, have been getting worse since. Pt reports swelling left side. Denies fevers     Triage Assessment (Adult)       Row Name 05/23/25 2100          Triage Assessment    Airway WDL WDL        Respiratory WDL    Respiratory WDL WDL        Skin Circulation/Temperature WDL    Skin Circulation/Temperature WDL WDL        Cardiac WDL    Cardiac WDL WDL        Peripheral/Neurovascular WDL    Peripheral Neurovascular WDL WDL        Cognitive/Neuro/Behavioral WDL    Cognitive/Neuro/Behavioral WDL WDL

## 2025-05-24 NOTE — ED PROVIDER NOTES
EMERGENCY DEPARTMENT ENCOUnter      NAME: Nancy Joyner  AGE: 44 year old female  YOB: 1980  MRN: [id number]  EVALUATION DATE & TIME: 5/23/2025  9:04 PM    PCP: Deborah Mayes    ED PROVIDER: Yoli Herrera MD      Chief Complaint   Patient presents with    Facial Pain    Neck Pain         FINAL IMPRESSION:  1. Parotitis, acute          ED COURSE & MEDICAL DECISION MAKING:      In summary, the patient is a 44-year-old female that presents to the emergency department for evaluation of left-sided face and neck swelling and erythema thought secondary to acute parotitis.  Will treat with antibiotics and refer to ENT for close outpatient follow-up.    2105-evaluated patient.  Toradol 15 mg IV was administered for pain.  2330-rechecked, updated and discharged.  Augmentin 875 mg p.o. was administered for initiation of antibiotic therapy for treatment of parotitis.    Medical Decision Making  I reviewed the EMR: Outpatient Record: clinic notes  Discharge. I prescribed additional prescription strength medication(s) as charted. See documentation for any additional details.    MIPS (CTPE, Dental pain, Diez, Sinusitis, Asthma/COPD, Head Trauma): Not Applicable    SEPSIS: None          At the conclusion of the encounter I discussed the results of all of the tests and the disposition. The questions were answered. The patient or family acknowledged understanding and was agreeable with the care plan.         MEDICATIONS GIVEN IN THE EMERGENCY:  Medications   ketorolac (TORADOL) injection 15 mg (15 mg Intravenous $Given 5/23/25 2210)   iopamidol (ISOVUE-370) solution 90 mL (90 mLs Intravenous $Given 5/23/25 2231)   amoxicillin-clavulanate (AUGMENTIN) 875-125 MG per tablet 1 tablet (1 tablet Oral $Given 5/23/25 2341)       NEW PRESCRIPTIONS STARTED AT TODAY'S ER VISIT  Discharge Medication List as of 5/23/2025 11:37 PM        START taking these medications    Details   amoxicillin-clavulanate  (AUGMENTIN) 875-125 MG tablet Take 1 tablet by mouth 2 times daily for 10 days., Disp-20 tablet, R-0, E-Prescribe                =================================================================    HPI        Nancy Joyner is a 44 year old female with no pertinent medical history presents to the emergency department for evaluation of left face and neck redness and swelling that started 3 days ago and has been worsening.  SHe describes her pain as sharp, constant, 6 out of 10 in intensity, worsened with eating, and not relieved by any activity.  She has not had any fevers, chills, nausea, or vomiting.  She denies any tooth pain.  She denies a sore throat.  She has not been ill recently.  She denies any known ill exposures.      REVIEW OF SYSTEMS     Constitutional:  Denies fever or chills  HENT:  Left face and neck redness and swelling  Respiratory:  Denies cough or shortness of breath   Cardiovascular:  Denies chest pain or palpitations  GI:  Denies abdominal pain, nausea, or vomiting  Musculoskeletal:  Denies any new extremity pain   Skin: Redness on the left side of her face and neck  Neurologic:  Denies headache, focal weakness or sensory changes    All other systems reviewed and are negative      PAST MEDICAL HISTORY:  Past Medical History:   Diagnosis Date    Headache     Headache     Headache(784.0)     NSTEMI (non-ST elevated myocardial infarction) (H) 9/16/2021       PAST SURGICAL HISTORY:  Past Surgical History:   Procedure Laterality Date    BIOPSY      CHOLECYSTECTOMY, LAPOROSCOPIC  12/09    Cholecystectomy, Laparoscopic    CV CORONARY ANGIOGRAM WITH LV GRAM N/A 9/16/2021    Procedure: Coronary Angiogram with Left Ventricular;  Surgeon: Margarito Perez MD;  Location: [facility] CATH LAB CV    HC REMOVAL OF TONSILS,<13 Y/O      ZZC NONSPECIFIC PROCEDURE  4/03    broken nose           CURRENT MEDICATIONS:    amoxicillin-clavulanate (AUGMENTIN) 875-125 MG tablet  calcium carbonate-vitamin D  (OSCAL W/D) 500-200 MG-UNIT tablet  fluticasone (FLONASE) 50 MCG/ACT spray  folic acid (FOLVITE) 400 MCG tablet  Magnesium Oxide 250 MG TABS  metoprolol tartrate (LOPRESSOR) 25 MG tablet  Multiple Vitamins-Minerals (HAIR SKIN AND NAILS FORMULA PO)  multivitamin w/minerals (THERA-VIT-M) tablet        ALLERGIES:  Allergies   Allergen Reactions    Cetirizine Rash       FAMILY HISTORY:  Family History   Problem Relation Age of Onset    Gastrointestinal Disease Mother         ulcers    Neurologic Disorder Mother         migraines    Hyperlipidemia Father     Cardiovascular Paternal Grandfather         2 MI    Lipids Paternal Grandfather     Hypertension Paternal Grandfather     Circulatory Paternal Grandfather         blood clots in legs    Prostate Cancer Paternal Grandfather     Eye Disorder Paternal Grandfather         glaucoma    Diabetes Paternal Grandfather     Hyperlipidemia Paternal Grandfather     Other - See Comments Paternal Grandfather         enlarged heart and blood clotting issues. History of heart attacks    Lipids Maternal Grandmother     Hypertension Maternal Grandmother     Neurologic Disorder Maternal Grandmother         migraines/brain aneurysm    Respiratory Sister         asthma    Eye Disorder Paternal Grandmother         glaucoma    Breast Cancer Paternal Grandmother        SOCIAL HISTORY:   Social History     Socioeconomic History    Marital status:    Tobacco Use    Smoking status: Never    Smokeless tobacco: Never   Vaping Use    Vaping status: Never Used   Substance and Sexual Activity    Alcohol use: Not Currently     Comment: 2/mo    Drug use: No    Sexual activity: Yes     Partners: Male     Birth control/protection: Condom     Comment: Depo provera-   Other Topics Concern     Service No    Blood Transfusions No    Caffeine Concern No    Occupational Exposure No    Hobby Hazards No    Sleep Concern No    Stress Concern No    Weight Concern Yes     Comment: trying to  lose alter diet not going well    Special Diet No    Back Care No    Exercise Yes    Bike Helmet Yes    Seat Belt Yes    Self-Exams Yes    Parent/sibling w/ CABG, MI or angioplasty before 65F 55M? No   Social History Narrative    Dairy/d 1 servings/d. plus a calcium supplement    Caffeine 0 servings/d    Exercise 3 x week walks     Sunscreen used - Yes    Seatbelts used - Yes    Working smoke/CO detectors in the home - Yes    Guns stored in the home - No    Self Breast Exams - Yes    Self Testicular Exam - NA    Eye Exam up to date - yes    Dental Exam up to date - Yes    Pap Smear up to date -no    Mammogram up to date - NA    PSA up to date - NA    Dexa Scan up to date - NA    Flex Sig / Colonoscopy up to date - NA    Immunizations up to date - Yes-Td 2008    Abuse: Current or Past(Physical, Sexual or Emotional)- No    Do you feel safe in your environment - Yes                   VITALS:  Patient Vitals for the past 24 hrs:   BP Temp Temp src Pulse Resp SpO2   05/23/25 2341 133/82 -- -- -- -- --   05/23/25 2340 -- -- -- 85 -- 100 %   05/23/25 2339 -- -- -- 79 -- 100 %   05/23/25 2338 -- -- -- 84 -- 100 %   05/23/25 2337 -- -- -- 74 -- 100 %   05/23/25 2336 -- -- -- 84 -- 100 %   05/23/25 2329 -- -- -- 74 -- 100 %   05/23/25 2314 -- -- -- 76 -- 100 %   05/23/25 2259 -- -- -- 79 -- 100 %   05/23/25 2250 -- -- -- 69 -- 100 %   05/23/25 2243 -- -- -- 74 -- 100 %   05/23/25 2228 -- -- -- 80 -- 100 %   05/23/25 2213 132/66 -- -- 76 -- 99 %   05/23/25 2210 (!) 143/69 -- -- 79 -- 99 %   05/23/25 2058 132/89 97.4  F (36.3  C) Temporal 82 18 100 %       PHYSICAL EXAM    Constitutional:  Well developed, Well nourished,  HENT:  Normocephalic, Atraumatic, Bilateral external ears normal, Oropharynx moist, Nose normal.  Left parotid and submandibular edema and erythema.  Pain with palpation.  Neck:  Normal range of motion, No meningismus, No stridor.   Eyes:  EOMI, Conjunctiva normal, No discharge.   Respiratory:  Normal breath  sounds, No respiratory distress, No wheezing, No chest tenderness.   Cardiovascular:  Normal heart rate, Normal rhythm, No murmurs  GI:  Soft, No tenderness,   Musculoskeletal:  Neurovascularly intact distally, No edema, No tenderness, No cyanosis, Good range of motion in all major joints.   Integument:  Warm, Dry, No erythema, No rash.   Lymphatic:  No lymphadenopathy noted.   Neurologic:  Alert & oriented , Normal motor function,  No focal deficits noted.   Psychiatric:  Affect normal, Judgment normal, Mood normal.      LAB:  All pertinent labs reviewed and interpreted.  Results for orders placed or performed during the hospital encounter of 05/23/25   Soft tissue neck CT w contrast    Impression    IMPRESSION:   1.  Acute infectious/inflammatory left-sided parotitis with overall soft tissue thickening/inflammatory change, likely reflecting cellulitis, with extension inferiorly into the left anterolateral neck and submandibular space. No drainable fluid   collection.  2.  Additional findings, as above.   Basic metabolic panel   Result Value Ref Range    Sodium 140 135 - 145 mmol/L    Potassium 4.2 3.4 - 5.3 mmol/L    Chloride 105 98 - 107 mmol/L    Carbon Dioxide (CO2) 24 22 - 29 mmol/L    Anion Gap 11 7 - 15 mmol/L    Urea Nitrogen 11.9 6.0 - 20.0 mg/dL    Creatinine 0.93 0.51 - 0.95 mg/dL    GFR Estimate 78 >60 mL/min/1.73m2    Calcium 9.0 8.8 - 10.4 mg/dL    Glucose 109 (H) 70 - 99 mg/dL   Result Value Ref Range    Mononucleosis Screen Negative Negative   Result Value Ref Range    CRP Inflammation 14.70 (H) <5.00 mg/L   CBC with platelets and differential   Result Value Ref Range    WBC Count 11.9 (H) 4.0 - 11.0 10e3/uL    RBC Count 4.58 3.80 - 5.20 10e6/uL    Hemoglobin 13.4 11.7 - 15.7 g/dL    Hematocrit 40.3 35.0 - 47.0 %    MCV 88 78 - 100 fL    MCH 29.3 26.5 - 33.0 pg    MCHC 33.3 31.5 - 36.5 g/dL    RDW 13.2 10.0 - 15.0 %    Platelet Count 300 150 - 450 10e3/uL    % Neutrophils 66 %    % Lymphocytes 27 %     % Monocytes 5 %    % Eosinophils 1 %    % Basophils 1 %    % Immature Granulocytes 1 %    NRBCs per 100 WBC 0 <1 /100    Absolute Neutrophils 7.8 1.6 - 8.3 10e3/uL    Absolute Lymphocytes 3.3 0.8 - 5.3 10e3/uL    Absolute Monocytes 0.6 0.0 - 1.3 10e3/uL    Absolute Eosinophils 0.1 0.0 - 0.7 10e3/uL    Absolute Basophils 0.1 0.0 - 0.2 10e3/uL    Absolute Immature Granulocytes 0.1 <=0.4 10e3/uL    Absolute NRBCs 0.0 10e3/uL       RADIOLOGY:  I have independently reviewed and interpreted the above imaging, pending the final radiology read.  Soft tissue neck CT w contrast   Final Result   IMPRESSION:    1.  Acute infectious/inflammatory left-sided parotitis with overall soft tissue thickening/inflammatory change, likely reflecting cellulitis, with extension inferiorly into the left anterolateral neck and submandibular space. No drainable fluid    collection.   2.  Additional findings, as above.                Yoli Herrera MD  Emergency Medicine  [facility] EMERGENCY ROOM  [address] [facility] [facility] [id number] [phone]  Dept: [phone]       Yoli Herrera MD  05/24/25 7530

## 2025-05-30 ENCOUNTER — TRANSFERRED RECORDS (OUTPATIENT)
Dept: HEALTH INFORMATION MANAGEMENT | Facility: CLINIC | Age: 45
End: 2025-05-30
Payer: COMMERCIAL

## 2025-07-12 ENCOUNTER — HEALTH MAINTENANCE LETTER (OUTPATIENT)
Age: 45
End: 2025-07-12

## (undated) DEVICE — CATH ANGIO INFINITI JR4 4FRX100CM 538421

## (undated) DEVICE — SHEATH EXT 10CM STAND TALL W/ SECURE CLASP ST0010L

## (undated) DEVICE — CUSTOM PACK CORONARY SAN5BCRHEA

## (undated) DEVICE — SHEATH GLIDE RADIAL 4FR 25CM 0.021

## (undated) DEVICE — KIT HAND CONTROL ACIST 014644 AR-P54

## (undated) DEVICE — CATH DIAG 4FR ANG PIG 538453S

## (undated) DEVICE — MANIFOLD KIT ANGIO AUTOMATED 014613

## (undated) DEVICE — GUIDEWIRE VASCULAR 0.035IN DIA 145CML 15CML/6CML STAINLESS

## (undated) DEVICE — CATH ANGIO INFINITI JL4 4FRX100CM 538420

## (undated) DEVICE — SYR ANGIOGRAPHY MULTIUSE KIT ACIST 014612

## (undated) DEVICE — SLEEVE TR BAND RADIAL COMPRESSION DEVICE 24CM TRB24-REG

## (undated) DEVICE — CATH ANGIO INFINITI JL3.5 4FRX100CM 538418

## (undated) DEVICE — ELECTRODE ADULT PACING MULTI P-211-M1

## (undated) RX ORDER — FENTANYL CITRATE 50 UG/ML
INJECTION, SOLUTION INTRAMUSCULAR; INTRAVENOUS
Status: DISPENSED
Start: 2021-09-16

## (undated) RX ORDER — DIAZEPAM 5 MG
TABLET ORAL
Status: DISPENSED
Start: 2021-09-16